# Patient Record
Sex: FEMALE | Race: BLACK OR AFRICAN AMERICAN | Employment: FULL TIME | URBAN - METROPOLITAN AREA
[De-identification: names, ages, dates, MRNs, and addresses within clinical notes are randomized per-mention and may not be internally consistent; named-entity substitution may affect disease eponyms.]

---

## 2017-10-06 RX ORDER — MEDROXYPROGESTERONE ACETATE 150 MG/ML
150 INJECTION, SUSPENSION INTRAMUSCULAR
Qty: 1 SYRINGE | Refills: 0 | Status: SHIPPED | OUTPATIENT
Start: 2017-10-06 | End: 2021-08-17

## 2017-10-06 NOTE — TELEPHONE ENCOUNTER
Refill request for Medroxyprogesterone rec'd from pharmacy. Pts last appt 8/2016. Last refill was 5/29/17. Will discuss with Dr. Kassandra Rivero.  LM for pt

## 2017-10-06 NOTE — TELEPHONE ENCOUNTER
Refill request for Medroxyprogesterone 150mg IM susp. Pt last seen 8/16/16. Per Dr. Feroz Mariscal ok to refill one time. Pt to make a f/u appt for further refills. Sent to pharmacy.

## 2018-06-08 RX ORDER — MEDROXYPROGESTERONE ACETATE 150 MG/ML
150 INJECTION, SUSPENSION INTRAMUSCULAR
Qty: 1 SYRINGE | Refills: 0 | OUTPATIENT
Start: 2018-06-08

## 2020-08-13 ENCOUNTER — APPOINTMENT (OUTPATIENT)
Dept: GENERAL RADIOLOGY | Facility: HOSPITAL | Age: 55
End: 2020-08-13
Attending: EMERGENCY MEDICINE
Payer: COMMERCIAL

## 2020-08-13 ENCOUNTER — HOSPITAL ENCOUNTER (EMERGENCY)
Facility: HOSPITAL | Age: 55
Discharge: HOME | End: 2020-08-13
Attending: EMERGENCY MEDICINE
Payer: COMMERCIAL

## 2020-08-13 VITALS
DIASTOLIC BLOOD PRESSURE: 79 MMHG | HEART RATE: 75 BPM | HEIGHT: 65 IN | BODY MASS INDEX: 33.32 KG/M2 | OXYGEN SATURATION: 99 % | RESPIRATION RATE: 15 BRPM | TEMPERATURE: 98 F | SYSTOLIC BLOOD PRESSURE: 116 MMHG | WEIGHT: 200 LBS

## 2020-08-13 DIAGNOSIS — R11.2 NAUSEA VOMITING AND DIARRHEA: Primary | ICD-10-CM

## 2020-08-13 DIAGNOSIS — R19.7 NAUSEA VOMITING AND DIARRHEA: Primary | ICD-10-CM

## 2020-08-13 LAB
ALBUMIN SERPL-MCNC: 4.1 G/DL (ref 3.4–5)
ALP LIVER SERPL-CCNC: 114 U/L (ref 46–118)
ALT SERPL-CCNC: 20 U/L (ref 13–56)
ANION GAP SERPL CALC-SCNC: 9 MMOL/L (ref 0–18)
AST SERPL-CCNC: 18 U/L (ref 15–37)
BASOPHILS # BLD AUTO: 0.03 X10(3) UL (ref 0–0.2)
BASOPHILS NFR BLD AUTO: 0.4 %
BILIRUB DIRECT SERPL-MCNC: 0.1 MG/DL (ref 0–0.2)
BILIRUB SERPL-MCNC: 0.5 MG/DL (ref 0.1–2)
BUN BLD-MCNC: 15 MG/DL (ref 7–18)
BUN/CREAT SERPL: 11.9 (ref 10–20)
CALCIUM BLD-MCNC: 10 MG/DL (ref 8.5–10.1)
CHLORIDE SERPL-SCNC: 99 MMOL/L (ref 98–112)
CO2 SERPL-SCNC: 27 MMOL/L (ref 21–32)
CREAT BLD-MCNC: 1.26 MG/DL (ref 0.55–1.02)
DEPRECATED RDW RBC AUTO: 37.8 FL (ref 35.1–46.3)
EOSINOPHIL # BLD AUTO: 0.09 X10(3) UL (ref 0–0.7)
EOSINOPHIL NFR BLD AUTO: 1.2 %
ERYTHROCYTE [DISTWIDTH] IN BLOOD BY AUTOMATED COUNT: 12.8 % (ref 11–15)
GLUCOSE BLD-MCNC: 96 MG/DL (ref 70–99)
HCT VFR BLD AUTO: 39.4 % (ref 35–48)
HGB BLD-MCNC: 13.6 G/DL (ref 12–16)
IMM GRANULOCYTES # BLD AUTO: 0.01 X10(3) UL (ref 0–1)
IMM GRANULOCYTES NFR BLD: 0.1 %
LIPASE SERPL-CCNC: 87 U/L (ref 73–393)
LYMPHOCYTES # BLD AUTO: 2.14 X10(3) UL (ref 1–4)
LYMPHOCYTES NFR BLD AUTO: 27.8 %
M PROTEIN MFR SERPL ELPH: 8.9 G/DL (ref 6.4–8.2)
MCH RBC QN AUTO: 28.1 PG (ref 26–34)
MCHC RBC AUTO-ENTMCNC: 34.5 G/DL (ref 31–37)
MCV RBC AUTO: 81.4 FL (ref 80–100)
MONOCYTES # BLD AUTO: 0.69 X10(3) UL (ref 0.1–1)
MONOCYTES NFR BLD AUTO: 9 %
NEUTROPHILS # BLD AUTO: 4.73 X10 (3) UL (ref 1.5–7.7)
NEUTROPHILS # BLD AUTO: 4.73 X10(3) UL (ref 1.5–7.7)
NEUTROPHILS NFR BLD AUTO: 61.5 %
OSMOLALITY SERPL CALC.SUM OF ELEC: 281 MOSM/KG (ref 275–295)
PLATELET # BLD AUTO: 316 10(3)UL (ref 150–450)
POTASSIUM SERPL-SCNC: 3.6 MMOL/L (ref 3.5–5.1)
RBC # BLD AUTO: 4.84 X10(6)UL (ref 3.8–5.3)
SARS-COV-2 RNA RESP QL NAA+PROBE: NOT DETECTED
SODIUM SERPL-SCNC: 135 MMOL/L (ref 136–145)
WBC # BLD AUTO: 7.7 X10(3) UL (ref 4–11)

## 2020-08-13 PROCEDURE — 85025 COMPLETE CBC W/AUTO DIFF WBC: CPT

## 2020-08-13 PROCEDURE — 96372 THER/PROPH/DIAG INJ SC/IM: CPT

## 2020-08-13 PROCEDURE — 83690 ASSAY OF LIPASE: CPT | Performed by: EMERGENCY MEDICINE

## 2020-08-13 PROCEDURE — 85025 COMPLETE CBC W/AUTO DIFF WBC: CPT | Performed by: EMERGENCY MEDICINE

## 2020-08-13 PROCEDURE — 80076 HEPATIC FUNCTION PANEL: CPT | Performed by: EMERGENCY MEDICINE

## 2020-08-13 PROCEDURE — 80048 BASIC METABOLIC PNL TOTAL CA: CPT

## 2020-08-13 PROCEDURE — 96361 HYDRATE IV INFUSION ADD-ON: CPT

## 2020-08-13 PROCEDURE — 71045 X-RAY EXAM CHEST 1 VIEW: CPT | Performed by: EMERGENCY MEDICINE

## 2020-08-13 PROCEDURE — 96374 THER/PROPH/DIAG INJ IV PUSH: CPT

## 2020-08-13 PROCEDURE — 99284 EMERGENCY DEPT VISIT MOD MDM: CPT

## 2020-08-13 PROCEDURE — 80048 BASIC METABOLIC PNL TOTAL CA: CPT | Performed by: EMERGENCY MEDICINE

## 2020-08-13 RX ORDER — DICYCLOMINE HYDROCHLORIDE 10 MG/ML
20 INJECTION INTRAMUSCULAR ONCE
Status: COMPLETED | OUTPATIENT
Start: 2020-08-13 | End: 2020-08-13

## 2020-08-13 RX ORDER — DICYCLOMINE HCL 20 MG
20 TABLET ORAL 4 TIMES DAILY PRN
Qty: 30 TABLET | Refills: 0 | Status: SHIPPED | OUTPATIENT
Start: 2020-08-13 | End: 2020-09-12

## 2020-08-13 RX ORDER — ONDANSETRON 2 MG/ML
4 INJECTION INTRAMUSCULAR; INTRAVENOUS ONCE
Status: COMPLETED | OUTPATIENT
Start: 2020-08-13 | End: 2020-08-13

## 2020-08-13 RX ORDER — ONDANSETRON 4 MG/1
4 TABLET, ORALLY DISINTEGRATING ORAL EVERY 4 HOURS PRN
Qty: 10 TABLET | Refills: 0 | Status: SHIPPED | OUTPATIENT
Start: 2020-08-13 | End: 2020-08-20

## 2020-08-13 NOTE — ED PROVIDER NOTES
Patient Seen in: [facility] AND [facility] Emergency Department      History   Patient presents with:  Nausea/Vomiting/Diarrhea    Stated Complaint: TL - vomiting x 3 days, sweating, nausea.   April K    HPI    62 yoF with HTN and IBS presents for evaluation of n Effort: Pulmonary effort is normal.      Breath sounds: Normal breath sounds. Abdominal:      General: There is no distension. Palpations: Abdomen is soft. Tenderness: There is no tenderness.  There is no right CVA tenderness or left CVA tende symptoms, poor p.o. intake, or abdominal pain and she is agreeable to the plan.               Disposition and Plan     Clinical Impression:  Nausea vomiting and diarrhea  (primary encounter diagnosis)    Disposition:  Discharge  8/13/2020  1:16 pm    Follow

## 2020-09-09 ENCOUNTER — OFFICE VISIT (OUTPATIENT)
Dept: OTOLARYNGOLOGY | Facility: CLINIC | Age: 55
End: 2020-09-09
Payer: COMMERCIAL

## 2020-09-09 VITALS
SYSTOLIC BLOOD PRESSURE: 166 MMHG | WEIGHT: 200 LBS | TEMPERATURE: 99 F | HEIGHT: 65 IN | DIASTOLIC BLOOD PRESSURE: 104 MMHG | BODY MASS INDEX: 33.32 KG/M2

## 2020-09-09 DIAGNOSIS — R13.10 DYSPHAGIA, UNSPECIFIED TYPE: Primary | ICD-10-CM

## 2020-09-09 PROCEDURE — 3077F SYST BP >= 140 MM HG: CPT | Performed by: OTOLARYNGOLOGY

## 2020-09-09 PROCEDURE — 31575 DIAGNOSTIC LARYNGOSCOPY: CPT | Performed by: OTOLARYNGOLOGY

## 2020-09-09 PROCEDURE — 3080F DIAST BP >= 90 MM HG: CPT | Performed by: OTOLARYNGOLOGY

## 2020-09-09 PROCEDURE — 99203 OFFICE O/P NEW LOW 30 MIN: CPT | Performed by: OTOLARYNGOLOGY

## 2020-09-09 PROCEDURE — 3008F BODY MASS INDEX DOCD: CPT | Performed by: OTOLARYNGOLOGY

## 2020-09-09 RX ORDER — LISINOPRIL AND HYDROCHLOROTHIAZIDE 25; 20 MG/1; MG/1
TABLET ORAL
COMMUNITY
Start: 2020-07-20

## 2020-09-09 RX ORDER — OMEPRAZOLE 40 MG/1
CAPSULE, DELAYED RELEASE ORAL
COMMUNITY
Start: 2020-08-19 | End: 2021-08-17

## 2020-09-09 RX ORDER — DICLOFENAC SODIUM 75 MG/1
TABLET, DELAYED RELEASE ORAL
COMMUNITY
Start: 2020-09-06

## 2020-09-09 RX ORDER — MONTELUKAST SODIUM 10 MG/1
10 TABLET ORAL DAILY
COMMUNITY
Start: 2020-08-19 | End: 2021-08-17

## 2020-09-09 NOTE — PROGRESS NOTES
Germaine Love is a 62year old female.   Patient presents with:  Throat Problem: c/o burning sensation, per pt she have acid reflux      HISTORY OF PRESENT ILLNESS  9/9/2020  Patient presents for evaluation of concerns over a persistent feeling in her t Respiratory Negative Dyspnea and wheezing. Cardio Negative Chest pain, irregular heartbeat/palpitations and syncope. GI Negative Abdominal pain and diarrhea. Endocrine Negative Cold intolerance and heat intolerance. Neuro Negative Tremors.    Psyc proceed. Topical pontocaine and neosynephrine was instilled into the bilateral nasal cavities. The flexible fiberoptic laryngoscope was threaded into the right nasal cavity and threaded into the nasopharynx.  The septum was noted to be deviated  The turbina then a reassessment. SHe has been omeprazole for years in the past and this has never helped her so she doesnt want to proceed.  I recommended a videos wallowing study and also that she cond= food elimination diets to see if this helps her gas and blo

## 2020-09-10 ENCOUNTER — ORDER TRANSCRIPTION (OUTPATIENT)
Dept: ADMINISTRATIVE | Facility: HOSPITAL | Age: 55
End: 2020-09-10

## 2020-09-10 DIAGNOSIS — R13.10 DYSPHAGIA, UNSPECIFIED TYPE: Primary | ICD-10-CM

## 2020-09-10 DIAGNOSIS — Z01.818 PRE-PROCEDURAL EXAMINATION: ICD-10-CM

## 2020-09-10 DIAGNOSIS — Z11.59 ENCOUNTER FOR SCREENING FOR OTHER VIRAL DISEASES: ICD-10-CM

## 2020-09-16 ENCOUNTER — LAB ENCOUNTER (OUTPATIENT)
Dept: LAB | Facility: HOSPITAL | Age: 55
End: 2020-09-16
Attending: OTOLARYNGOLOGY
Payer: COMMERCIAL

## 2020-09-16 DIAGNOSIS — Z01.812 PRE-PROCEDURE LAB EXAM: Primary | ICD-10-CM

## 2020-09-17 LAB — SARS-COV-2 RNA RESP QL NAA+PROBE: NOT DETECTED

## 2020-09-18 ENCOUNTER — PATIENT MESSAGE (OUTPATIENT)
Dept: ADMINISTRATIVE | Facility: HOSPITAL | Age: 55
End: 2020-09-18

## 2020-09-19 ENCOUNTER — HOSPITAL ENCOUNTER (OUTPATIENT)
Dept: GENERAL RADIOLOGY | Facility: HOSPITAL | Age: 55
Discharge: HOME | End: 2020-09-19
Attending: OTOLARYNGOLOGY
Payer: COMMERCIAL

## 2020-09-19 DIAGNOSIS — R13.10 DYSPHAGIA, UNSPECIFIED TYPE: ICD-10-CM

## 2020-09-19 PROCEDURE — 92611 MOTION FLUOROSCOPY/SWALLOW: CPT

## 2020-09-19 PROCEDURE — 74230 X-RAY XM SWLNG FUNCJ C+: CPT | Performed by: OTOLARYNGOLOGY

## 2020-11-15 ENCOUNTER — APPOINTMENT (OUTPATIENT)
Dept: CT IMAGING | Facility: HOSPITAL | Age: 55
End: 2020-11-15
Attending: EMERGENCY MEDICINE
Payer: COMMERCIAL

## 2020-11-15 ENCOUNTER — HOSPITAL ENCOUNTER (EMERGENCY)
Facility: HOSPITAL | Age: 55
Discharge: HOME | End: 2020-11-15
Attending: EMERGENCY MEDICINE
Payer: COMMERCIAL

## 2020-11-15 VITALS
WEIGHT: 200 LBS | TEMPERATURE: 98 F | SYSTOLIC BLOOD PRESSURE: 118 MMHG | BODY MASS INDEX: 33.32 KG/M2 | HEIGHT: 65 IN | DIASTOLIC BLOOD PRESSURE: 85 MMHG | RESPIRATION RATE: 20 BRPM | OXYGEN SATURATION: 97 % | HEART RATE: 77 BPM

## 2020-11-15 DIAGNOSIS — K86.2 PANCREATIC CYST: ICD-10-CM

## 2020-11-15 DIAGNOSIS — R10.9 ABDOMINAL PAIN, ACUTE: ICD-10-CM

## 2020-11-15 DIAGNOSIS — N30.00 ACUTE CYSTITIS WITHOUT HEMATURIA: ICD-10-CM

## 2020-11-15 DIAGNOSIS — R11.2 NAUSEA AND VOMITING IN ADULT: Primary | ICD-10-CM

## 2020-11-15 PROCEDURE — 80076 HEPATIC FUNCTION PANEL: CPT | Performed by: EMERGENCY MEDICINE

## 2020-11-15 PROCEDURE — S0028 INJECTION, FAMOTIDINE, 20 MG: HCPCS | Performed by: EMERGENCY MEDICINE

## 2020-11-15 PROCEDURE — 99285 EMERGENCY DEPT VISIT HI MDM: CPT

## 2020-11-15 PROCEDURE — 83690 ASSAY OF LIPASE: CPT | Performed by: EMERGENCY MEDICINE

## 2020-11-15 PROCEDURE — 85025 COMPLETE CBC W/AUTO DIFF WBC: CPT | Performed by: EMERGENCY MEDICINE

## 2020-11-15 PROCEDURE — 81001 URINALYSIS AUTO W/SCOPE: CPT | Performed by: EMERGENCY MEDICINE

## 2020-11-15 PROCEDURE — 96374 THER/PROPH/DIAG INJ IV PUSH: CPT

## 2020-11-15 PROCEDURE — 96361 HYDRATE IV INFUSION ADD-ON: CPT

## 2020-11-15 PROCEDURE — 80048 BASIC METABOLIC PNL TOTAL CA: CPT | Performed by: EMERGENCY MEDICINE

## 2020-11-15 PROCEDURE — 96375 TX/PRO/DX INJ NEW DRUG ADDON: CPT

## 2020-11-15 PROCEDURE — 74177 CT ABD & PELVIS W/CONTRAST: CPT | Performed by: EMERGENCY MEDICINE

## 2020-11-15 PROCEDURE — 87086 URINE CULTURE/COLONY COUNT: CPT | Performed by: EMERGENCY MEDICINE

## 2020-11-15 RX ORDER — ONDANSETRON 4 MG/1
4 TABLET, ORALLY DISINTEGRATING ORAL EVERY 4 HOURS PRN
Qty: 10 TABLET | Refills: 0 | Status: SHIPPED | OUTPATIENT
Start: 2020-11-15 | End: 2020-11-22

## 2020-11-15 RX ORDER — PANTOPRAZOLE SODIUM 40 MG/1
40 TABLET, DELAYED RELEASE ORAL DAILY
Qty: 30 TABLET | Refills: 0 | Status: SHIPPED | OUTPATIENT
Start: 2020-11-15 | End: 2020-12-15

## 2020-11-15 RX ORDER — CEPHALEXIN 500 MG/1
500 CAPSULE ORAL 2 TIMES DAILY
Qty: 14 CAPSULE | Refills: 0 | Status: SHIPPED | OUTPATIENT
Start: 2020-11-15 | End: 2020-11-22

## 2020-11-15 RX ORDER — DICYCLOMINE HCL 20 MG
20 TABLET ORAL 4 TIMES DAILY PRN
Qty: 30 TABLET | Refills: 0 | Status: SHIPPED | OUTPATIENT
Start: 2020-11-15 | End: 2020-12-15

## 2020-11-15 RX ORDER — KETOROLAC TROMETHAMINE 15 MG/ML
15 INJECTION, SOLUTION INTRAMUSCULAR; INTRAVENOUS ONCE
Status: COMPLETED | OUTPATIENT
Start: 2020-11-15 | End: 2020-11-15

## 2020-11-15 RX ORDER — ONDANSETRON 2 MG/ML
4 INJECTION INTRAMUSCULAR; INTRAVENOUS ONCE
Status: COMPLETED | OUTPATIENT
Start: 2020-11-15 | End: 2020-11-15

## 2020-11-15 RX ORDER — FAMOTIDINE 10 MG/ML
20 INJECTION, SOLUTION INTRAVENOUS ONCE
Status: COMPLETED | OUTPATIENT
Start: 2020-11-15 | End: 2020-11-15

## 2020-11-15 NOTE — ED NOTES
Discharge instructions given to patient. Verbalized understanding. Patient in no distress. Patient left ED ambulatory steady gait. No distress. Patient tolerated meal tray.

## 2020-11-15 NOTE — ED INITIAL ASSESSMENT (HPI)
Pt presents to ED for vomiting/gagging x 4 days. Pt states she is unable to keep anything down. Pt denies diarrhea. + stomach pain in the lower part of her abdomen. Pt states she has a hx of GERD.  Pt did a video chat with her pmd and had her sertraline inc

## 2020-11-15 NOTE — ED PROVIDER NOTES
Patient Seen in: [facility] AND [facility] Emergency Department      History   Patient presents with:  Vomiting  Abdomen/Flank Pain    Stated Complaint: vomiting    HPI    59-year-old female presents for evaluation for nausea vomiting and abdominal pain.   Emesis 77   Temp 97.7 °F (36.5 °C) (Temporal)   Resp 20   Ht 165.1 cm (5' 5\")   Wt 90.7 kg   SpO2 97%   BMI 33.28 kg/m²         Physical Exam  Vitals signs and nursing note reviewed. Constitutional:       Appearance: She is well-developed.    HENT:      Head: N limits   LIPASE - Abnormal; Notable for the following components:    Lipase 72 (*)     All other components within normal limits   HEPATIC FUNCTION PANEL (7) - Abnormal; Notable for the following components:    AST 14 (*)     Total Protein 8.3 (*)     All (ER)  COMPARISON: None. INDICATIONS: Generalized abdominal pain with vomiting. TECHNIQUE: CT images of the abdomen and pelvis were obtained with non-ionic intravenous contrast material.  Automated exposure control for dose reduction was used.  Adjustment Sigmoid colon diverticulosis.     Dictated by (CST): Paul Bernard MD on 11/15/2020 at 8:02 AM     Finalized by (CST): Paul Bernard MD on 11/15/2020 at 8:10 AM              Clinical impression as well as any lab results and radiology findings were discus

## 2021-09-20 ENCOUNTER — TELEMEDICINE (OUTPATIENT)
Dept: INTERNAL MEDICINE CLINIC | Facility: CLINIC | Age: 56
End: 2021-09-20

## 2021-09-20 VITALS — BODY MASS INDEX: 36 KG/M2 | WEIGHT: 214 LBS

## 2021-09-20 DIAGNOSIS — E55.9 VITAMIN D DEFICIENCY: ICD-10-CM

## 2021-09-20 DIAGNOSIS — F41.9 ANXIETY AND DEPRESSION: ICD-10-CM

## 2021-09-20 DIAGNOSIS — K58.9 IRRITABLE BOWEL SYNDROME, UNSPECIFIED TYPE: ICD-10-CM

## 2021-09-20 DIAGNOSIS — F32.A ANXIETY AND DEPRESSION: ICD-10-CM

## 2021-09-20 DIAGNOSIS — E66.9 CLASS 2 OBESITY WITH BODY MASS INDEX (BMI) OF 35.0 TO 35.9 IN ADULT, UNSPECIFIED OBESITY TYPE, UNSPECIFIED WHETHER SERIOUS COMORBIDITY PRESENT: ICD-10-CM

## 2021-09-20 DIAGNOSIS — I10 HYPERTENSION, UNSPECIFIED TYPE: ICD-10-CM

## 2021-09-20 DIAGNOSIS — Z51.81 ENCOUNTER FOR THERAPEUTIC DRUG LEVEL MONITORING: Primary | ICD-10-CM

## 2021-09-20 PROCEDURE — 99204 OFFICE O/P NEW MOD 45 MIN: CPT | Performed by: PHYSICIAN ASSISTANT

## 2021-09-20 RX ORDER — CLONIDINE HYDROCHLORIDE 0.2 MG/1
1 TABLET ORAL 2 TIMES DAILY
COMMUNITY
Start: 2021-08-24

## 2021-09-20 NOTE — PROGRESS NOTES
[facility] WEIGHT MANAGEMENT VIRTUAL VIDEO ENCOUNTER     Kan Sinclair verbally consents to a Virtual/Telephone Check-In service on 09/20/21  Patient understands and accepts financial responsibility for any deductible, co-insurance and/or co-pays night    MEDICAL HISTORY  PMH reviewed:   Cardiac disorders: Mild mitral valve insufficiency  Depression/anxiety: YES - currently on zoloft  Glaucoma: negative  Kidney stones: negative  Eating disorder: negative  Migraines/seizures: negative  Joint-related 11/15/2020    GFRAA 91 11/15/2020    CA 9.7 11/15/2020    OSMOCALC 285 11/15/2020    ALKPHO 115 11/15/2020    AST 14 (L) 11/15/2020    ALT 16 11/15/2020    BILT 0.6 11/15/2020    TP 8.3 (H) 11/15/2020    ALB 4.0 11/15/2020     11/15/2020    K 3.6 11/ consuming enough calories  · Will hold on medication until after surgery  · EKG will be done at in person OV  · Will start medications: n/a  · --advised of side effects and adverse effects of this medication  · Contradictions: stimulant until EKG  · Body c Healthy snacks: peanut butter and apples, hummus and carrots, berries, nuts (1/4 cup), tuna and crackers                 Protein Shakes: Premier protein or Core Power                Protein Bars: Rx Bars, Oatmega, Power Crunch                 [place] betsey Raspberries: Half a cup (60 grams) contains 3 grams of carbs. Blackberries: Half a cup (70 grams) contains 4 grams of carbs. Strawberries: Half a cup (100 grams) contains 6 grams of carbs. Blueberries: Half a cup (50 grams) contains 6 grams of carbs.

## 2021-09-22 NOTE — PATIENT INSTRUCTIONS
1300 calories    Moderate Carb (30/35/35)  103g  Protein    53g  Fats    120g  carbs    Lower Carb (40/40/20)  137g  Protein    61g  Fats    68g  carbs    We are here to support you with weight loss, but please remember that you still need your primary car per week                Activity level: not very active (can't count exercise towards calorie number per day)                   ** Daily INPUT> Look at nutrition section-- \"nutrients\" and it will break down your macros for the day (ie.  Protein, carbs, fi

## 2021-09-27 ENCOUNTER — LAB ENCOUNTER (OUTPATIENT)
Dept: LAB | Facility: HOSPITAL | Age: 56
End: 2021-09-27
Attending: PHYSICIAN ASSISTANT
Payer: COMMERCIAL

## 2021-09-27 DIAGNOSIS — E66.9 CLASS 2 OBESITY WITH BODY MASS INDEX (BMI) OF 35.0 TO 35.9 IN ADULT, UNSPECIFIED OBESITY TYPE, UNSPECIFIED WHETHER SERIOUS COMORBIDITY PRESENT: ICD-10-CM

## 2021-09-27 DIAGNOSIS — E55.9 VITAMIN D DEFICIENCY: ICD-10-CM

## 2021-09-27 PROCEDURE — 82607 VITAMIN B-12: CPT

## 2021-09-27 PROCEDURE — 84443 ASSAY THYROID STIM HORMONE: CPT

## 2021-09-27 PROCEDURE — 80061 LIPID PANEL: CPT

## 2021-09-27 PROCEDURE — 85025 COMPLETE CBC W/AUTO DIFF WBC: CPT

## 2021-09-27 PROCEDURE — 82306 VITAMIN D 25 HYDROXY: CPT

## 2021-09-27 PROCEDURE — 80053 COMPREHEN METABOLIC PANEL: CPT

## 2021-09-27 PROCEDURE — 84439 ASSAY OF FREE THYROXINE: CPT

## 2021-09-27 PROCEDURE — 36415 COLL VENOUS BLD VENIPUNCTURE: CPT

## 2021-09-27 PROCEDURE — 83036 HEMOGLOBIN GLYCOSYLATED A1C: CPT

## 2021-10-04 RX ORDER — ERGOCALCIFEROL 1.25 MG/1
50000 CAPSULE ORAL WEEKLY
Qty: 12 CAPSULE | Refills: 0 | Status: SHIPPED | OUTPATIENT
Start: 2021-10-04 | End: 2021-10-29

## 2021-10-04 NOTE — PROGRESS NOTES
A1C consistent with prediabetes  Low Vit D, begin 50,000IU weekly x 12 weeks, and then OTC 2,000IU daily with food  + elevated cholesterol  Will discuss more at next OV

## 2021-10-29 ENCOUNTER — OFFICE VISIT (OUTPATIENT)
Dept: INTERNAL MEDICINE CLINIC | Facility: CLINIC | Age: 56
End: 2021-10-29
Payer: COMMERCIAL

## 2021-10-29 VITALS
WEIGHT: 223 LBS | DIASTOLIC BLOOD PRESSURE: 100 MMHG | SYSTOLIC BLOOD PRESSURE: 160 MMHG | OXYGEN SATURATION: 97 % | HEART RATE: 84 BPM | BODY MASS INDEX: 39.02 KG/M2 | RESPIRATION RATE: 16 BRPM | HEIGHT: 63.5 IN

## 2021-10-29 DIAGNOSIS — E55.9 VITAMIN D DEFICIENCY: ICD-10-CM

## 2021-10-29 DIAGNOSIS — E66.01 CLASS 2 SEVERE OBESITY WITH SERIOUS COMORBIDITY AND BODY MASS INDEX (BMI) OF 38.0 TO 38.9 IN ADULT, UNSPECIFIED OBESITY TYPE (HCC): ICD-10-CM

## 2021-10-29 DIAGNOSIS — Z51.81 ENCOUNTER FOR THERAPEUTIC DRUG LEVEL MONITORING: Primary | ICD-10-CM

## 2021-10-29 DIAGNOSIS — F32.A ANXIETY AND DEPRESSION: ICD-10-CM

## 2021-10-29 DIAGNOSIS — F41.9 ANXIETY AND DEPRESSION: ICD-10-CM

## 2021-10-29 DIAGNOSIS — I10 HYPERTENSION, UNSPECIFIED TYPE: ICD-10-CM

## 2021-10-29 DIAGNOSIS — R73.03 PREDIABETES: ICD-10-CM

## 2021-10-29 PROCEDURE — 3080F DIAST BP >= 90 MM HG: CPT | Performed by: PHYSICIAN ASSISTANT

## 2021-10-29 PROCEDURE — 99214 OFFICE O/P EST MOD 30 MIN: CPT | Performed by: PHYSICIAN ASSISTANT

## 2021-10-29 PROCEDURE — 3077F SYST BP >= 140 MM HG: CPT | Performed by: PHYSICIAN ASSISTANT

## 2021-10-29 PROCEDURE — 3008F BODY MASS INDEX DOCD: CPT | Performed by: PHYSICIAN ASSISTANT

## 2021-10-29 RX ORDER — TOPIRAMATE 25 MG/1
25 TABLET ORAL 2 TIMES DAILY
Qty: 180 TABLET | Refills: 0 | Status: SHIPPED | OUTPATIENT
Start: 2021-10-29 | End: 2022-01-06

## 2021-10-29 RX ORDER — FAMOTIDINE 40 MG/1
40 TABLET, FILM COATED ORAL DAILY
COMMUNITY
End: 2022-01-06

## 2021-10-29 RX ORDER — MULTIVITAMIN
1 TABLET ORAL DAILY
COMMUNITY
End: 2022-01-06

## 2021-10-29 NOTE — PROGRESS NOTES
HISTORY OF PRESENT ILLNESS  Patient presents with:  Weight Check: up [address] is a 60year old female here for follow up in medical weight loss program.   Up 9lbs  Not currently on any AOM  Denies chest pain, shortness of breath, dizziness, Results   Component Value Date    GLU 94 09/27/2021    BUN 18 09/27/2021    BUNCREA 18.0 09/27/2021    CREATSERUM 1.00 09/27/2021    ANIONGAP 7 09/27/2021    GFRNAA 63 09/27/2021    GFRAA 72 09/27/2021    CA 9.2 09/27/2021    OSMOCALC 292 09/27/2021    ALK depression    Vitamin D deficiency    Other orders  -     topiramate 25 MG Oral Tab; Take 1 tablet (25 mg total) by mouth 2 (two) times daily.         PLAN  Initial Weight: 214lbs - home weight  Initial Weight Date: 9/20/21  Today's Weight: 223lbs  5% Goal:

## 2021-11-09 ENCOUNTER — LAB REQUISITION (OUTPATIENT)
Dept: SURGERY | Age: 56
End: 2021-11-09
Payer: COMMERCIAL

## 2021-11-09 DIAGNOSIS — Z01.818 PREOP EXAMINATION: ICD-10-CM

## 2021-11-12 ENCOUNTER — LAB REQUISITION (OUTPATIENT)
Dept: SURGERY | Age: 56
End: 2021-11-12
Payer: COMMERCIAL

## 2021-11-12 ENCOUNTER — LAB REQUISITION (OUTPATIENT)
Dept: LAB | Facility: HOSPITAL | Age: 56
End: 2021-11-12
Payer: COMMERCIAL

## 2021-11-12 DIAGNOSIS — K80.45 CALCULUS OF BILE DUCT WITH CHRONIC CHOLECYSTITIS WITH OBSTRUCTION: ICD-10-CM

## 2021-11-12 PROCEDURE — 88304 TISSUE EXAM BY PATHOLOGIST: CPT | Performed by: SURGERY

## 2021-11-12 RX ORDER — TRAMADOL HYDROCHLORIDE 50 MG/1
50 TABLET ORAL EVERY 6 HOURS PRN
Qty: 10 TABLET | Refills: 0 | Status: SHIPPED | OUTPATIENT
Start: 2021-11-12 | End: 2022-01-06

## 2021-12-20 RX ORDER — ERGOCALCIFEROL 1.25 MG/1
50000 CAPSULE ORAL WEEKLY
Qty: 12 CAPSULE | Refills: 0 | OUTPATIENT
Start: 2021-12-20

## 2021-12-20 NOTE — TELEPHONE ENCOUNTER
Requesting   Requested Prescriptions     Pending Prescriptions Disp Refills   • ERGOCALCIFEROL 1.25 MG (86301 UT) Oral Cap [Pharmacy Med Name: VITAMIN D2 1.25MG(50,000 UNIT)] 12 capsule 0     Sig: TAKE 1 CAPSULE (50,000 UNITS TOTAL) BY MOUTH ONCE A WEEK.  Shanda Shaffer

## 2022-01-19 RX ORDER — TOPIRAMATE 25 MG/1
TABLET ORAL
Qty: 180 TABLET | Refills: 0 | Status: SHIPPED | OUTPATIENT
Start: 2022-01-19

## 2022-01-19 NOTE — TELEPHONE ENCOUNTER
Requesting   Requested Prescriptions     Pending Prescriptions Disp Refills   • TOPIRAMATE 25 MG Oral Tab [Pharmacy Med Name: TOPIRAMATE 25 MG TABLET] 180 tablet 0     Sig: TAKE 1 TABLET BY MOUTH TWICE A DAY     LOV: 10/29/21  RTC: none  Filled: 10/29/21 #

## 2022-06-27 ENCOUNTER — PATIENT MESSAGE (OUTPATIENT)
Dept: RHEUMATOLOGY | Facility: CLINIC | Age: 57
End: 2022-06-27

## 2022-06-27 NOTE — TELEPHONE ENCOUNTER
From: Naima Gorman  To: Kendra Ryan MD  Sent: 6/27/2022 8:55 AM CDT  Subject: Cancel July 26 Appointment     Please cancel 12 pm appointment on July 26. It was made in error.      Marry Ramon

## 2022-07-01 ENCOUNTER — OFFICE VISIT (OUTPATIENT)
Dept: OTOLARYNGOLOGY | Facility: CLINIC | Age: 57
End: 2022-07-01
Payer: COMMERCIAL

## 2022-07-01 VITALS — TEMPERATURE: 97 F

## 2022-07-01 DIAGNOSIS — R04.0 NOSEBLEED: ICD-10-CM

## 2022-07-01 DIAGNOSIS — R07.0 THROAT PAIN: Primary | ICD-10-CM

## 2022-07-01 RX ORDER — PANTOPRAZOLE SODIUM 40 MG/1
40 TABLET, DELAYED RELEASE ORAL DAILY
Qty: 60 TABLET | Refills: 3 | Status: SHIPPED | OUTPATIENT
Start: 2022-07-01

## 2022-07-01 RX ORDER — VENLAFAXINE HYDROCHLORIDE 150 MG/1
150 TABLET, EXTENDED RELEASE ORAL
COMMUNITY
Start: 2022-03-15

## 2022-07-01 RX ORDER — CELECOXIB 100 MG/1
100 CAPSULE ORAL DAILY
COMMUNITY
Start: 2022-05-02

## 2022-07-01 RX ORDER — TRAZODONE HYDROCHLORIDE 50 MG/1
25 TABLET ORAL NIGHTLY
COMMUNITY
Start: 2022-05-01

## 2022-07-01 RX ORDER — FLUTICASONE PROPIONATE 50 MCG
SPRAY, SUSPENSION (ML) NASAL DAILY
COMMUNITY

## 2022-07-01 RX ORDER — FAMOTIDINE 10 MG
10 TABLET ORAL 2 TIMES DAILY
COMMUNITY

## 2022-07-01 RX ORDER — AMLODIPINE BESYLATE 5 MG/1
5 TABLET ORAL DAILY
COMMUNITY
Start: 2022-05-11

## 2022-07-01 RX ORDER — MONTELUKAST SODIUM 10 MG/1
10 TABLET ORAL NIGHTLY
Qty: 30 TABLET | Refills: 3 | Status: SHIPPED | OUTPATIENT
Start: 2022-07-01

## 2022-07-13 ENCOUNTER — HOSPITAL ENCOUNTER (EMERGENCY)
Facility: HOSPITAL | Age: 57
Discharge: HOME | End: 2022-07-13
Attending: EMERGENCY MEDICINE
Payer: COMMERCIAL

## 2022-07-13 VITALS
RESPIRATION RATE: 16 BRPM | HEIGHT: 64 IN | BODY MASS INDEX: 33.29 KG/M2 | HEART RATE: 85 BPM | OXYGEN SATURATION: 95 % | DIASTOLIC BLOOD PRESSURE: 88 MMHG | TEMPERATURE: 99 F | WEIGHT: 195 LBS | SYSTOLIC BLOOD PRESSURE: 153 MMHG

## 2022-07-13 DIAGNOSIS — K58.9 IRRITABLE BOWEL SYNDROME, UNSPECIFIED TYPE: ICD-10-CM

## 2022-07-13 DIAGNOSIS — K92.2 GASTROINTESTINAL HEMORRHAGE, UNSPECIFIED GASTROINTESTINAL HEMORRHAGE TYPE: Primary | ICD-10-CM

## 2022-07-13 LAB
ANION GAP SERPL CALC-SCNC: 9 MMOL/L (ref 0–18)
BASOPHILS # BLD AUTO: 0.01 X10(3) UL (ref 0–0.2)
BASOPHILS NFR BLD AUTO: 0.1 %
BUN BLD-MCNC: 11 MG/DL (ref 7–18)
BUN/CREAT SERPL: 15.3 (ref 10–20)
CALCIUM BLD-MCNC: 9.5 MG/DL (ref 8.5–10.1)
CHLORIDE SERPL-SCNC: 105 MMOL/L (ref 98–112)
CO2 SERPL-SCNC: 28 MMOL/L (ref 21–32)
CREAT BLD-MCNC: 0.72 MG/DL
DEPRECATED RDW RBC AUTO: 39.3 FL (ref 35.1–46.3)
EOSINOPHIL # BLD AUTO: 0.11 X10(3) UL (ref 0–0.7)
EOSINOPHIL NFR BLD AUTO: 1 %
ERYTHROCYTE [DISTWIDTH] IN BLOOD BY AUTOMATED COUNT: 12.7 % (ref 11–15)
GLUCOSE BLD-MCNC: 101 MG/DL (ref 70–99)
HCT VFR BLD AUTO: 36 %
HGB BLD-MCNC: 11.8 G/DL
IMM GRANULOCYTES # BLD AUTO: 0.02 X10(3) UL (ref 0–1)
IMM GRANULOCYTES NFR BLD: 0.2 %
LYMPHOCYTES # BLD AUTO: 3.74 X10(3) UL (ref 1–4)
LYMPHOCYTES NFR BLD AUTO: 35.5 %
MCH RBC QN AUTO: 28.1 PG (ref 26–34)
MCHC RBC AUTO-ENTMCNC: 32.8 G/DL (ref 31–37)
MCV RBC AUTO: 85.7 FL
MONOCYTES # BLD AUTO: 0.81 X10(3) UL (ref 0.1–1)
MONOCYTES NFR BLD AUTO: 7.7 %
NEUTROPHILS # BLD AUTO: 5.86 X10 (3) UL (ref 1.5–7.7)
NEUTROPHILS # BLD AUTO: 5.86 X10(3) UL (ref 1.5–7.7)
NEUTROPHILS NFR BLD AUTO: 55.5 %
OSMOLALITY SERPL CALC.SUM OF ELEC: 294 MOSM/KG (ref 275–295)
PLATELET # BLD AUTO: 268 10(3)UL (ref 150–450)
POTASSIUM SERPL-SCNC: 3 MMOL/L (ref 3.5–5.1)
RBC # BLD AUTO: 4.2 X10(6)UL
SODIUM SERPL-SCNC: 142 MMOL/L (ref 136–145)
WBC # BLD AUTO: 10.6 X10(3) UL (ref 4–11)

## 2022-07-13 PROCEDURE — 99284 EMERGENCY DEPT VISIT MOD MDM: CPT

## 2022-07-13 PROCEDURE — 85025 COMPLETE CBC W/AUTO DIFF WBC: CPT | Performed by: EMERGENCY MEDICINE

## 2022-07-13 PROCEDURE — 82272 OCCULT BLD FECES 1-3 TESTS: CPT

## 2022-07-13 PROCEDURE — 96372 THER/PROPH/DIAG INJ SC/IM: CPT

## 2022-07-13 PROCEDURE — S0028 INJECTION, FAMOTIDINE, 20 MG: HCPCS | Performed by: EMERGENCY MEDICINE

## 2022-07-13 PROCEDURE — 80048 BASIC METABOLIC PNL TOTAL CA: CPT | Performed by: EMERGENCY MEDICINE

## 2022-07-13 PROCEDURE — 96361 HYDRATE IV INFUSION ADD-ON: CPT

## 2022-07-13 PROCEDURE — 96374 THER/PROPH/DIAG INJ IV PUSH: CPT

## 2022-07-13 RX ORDER — TRAMADOL HYDROCHLORIDE 50 MG/1
TABLET ORAL EVERY 6 HOURS PRN
Qty: 10 TABLET | Refills: 0 | Status: SHIPPED | OUTPATIENT
Start: 2022-07-13 | End: 2022-07-18

## 2022-07-13 RX ORDER — DICYCLOMINE HYDROCHLORIDE 10 MG/ML
20 INJECTION INTRAMUSCULAR ONCE
Status: COMPLETED | OUTPATIENT
Start: 2022-07-13 | End: 2022-07-13

## 2022-07-13 RX ORDER — PANTOPRAZOLE SODIUM 40 MG/1
40 TABLET, DELAYED RELEASE ORAL DAILY
Qty: 30 TABLET | Refills: 0 | Status: SHIPPED | OUTPATIENT
Start: 2022-07-13 | End: 2022-08-12

## 2022-07-13 RX ORDER — FAMOTIDINE 10 MG/ML
20 INJECTION, SOLUTION INTRAVENOUS ONCE
Status: COMPLETED | OUTPATIENT
Start: 2022-07-13 | End: 2022-07-13

## 2022-07-13 NOTE — ED INITIAL ASSESSMENT (HPI)
Patient to Er from home with c/o multiple episode of bright red bleeding from the rectum starting last night.

## 2022-09-29 NOTE — TELEPHONE ENCOUNTER
This refill request is being sent to the provider for the following reason:  []Patient has not had an appointment within the past 12 months but has made an appointment on: ___  []Medication is not within protocol  [x]Patient did not complete follow up recommendations    LOV: 07/01/2022  1 month follow up. I did recommend trialing Claritin-D Singulair and starting her on mupirocin 3 times daily for her nasal crusting to see if we can avoid production of these nasal clots on the right. Return to see me in 1 month.     []Other: ___

## 2022-10-01 RX ORDER — MONTELUKAST SODIUM 10 MG/1
TABLET ORAL
Qty: 90 TABLET | Refills: 1 | OUTPATIENT
Start: 2022-10-01

## 2022-10-13 ENCOUNTER — OFFICE VISIT (OUTPATIENT)
Dept: AUDIOLOGY | Facility: CLINIC | Age: 57
End: 2022-10-13
Payer: COMMERCIAL

## 2022-10-13 DIAGNOSIS — H90.3 SENSORINEURAL HEARING LOSS, BILATERAL: Primary | ICD-10-CM

## 2022-10-13 PROCEDURE — 92567 TYMPANOMETRY: CPT | Performed by: AUDIOLOGIST

## 2022-10-13 PROCEDURE — 92557 COMPREHENSIVE HEARING TEST: CPT | Performed by: AUDIOLOGIST

## 2022-11-07 RX ORDER — MONTELUKAST SODIUM 10 MG/1
10 TABLET ORAL NIGHTLY
Qty: 30 TABLET | Refills: 3 | Status: SHIPPED | OUTPATIENT
Start: 2022-11-07

## 2022-11-22 ENCOUNTER — OFFICE VISIT (OUTPATIENT)
Dept: AUDIOLOGY | Facility: CLINIC | Age: 57
End: 2022-11-22
Payer: COMMERCIAL

## 2022-11-22 DIAGNOSIS — H90.3 SENSORINEURAL HEARING LOSS, BILATERAL: Primary | ICD-10-CM

## 2022-11-22 PROCEDURE — 92591 HEARING AID EXAM, BOTH EARS: CPT | Performed by: AUDIOLOGIST

## 2022-11-22 PROCEDURE — 92700 UNLISTED ORL SERVICE/PX: CPT | Performed by: AUDIOLOGIST

## 2022-11-23 NOTE — PROGRESS NOTES
Hearing Aid Evaluation    Ky Coles  12/28/1965  [id number]    Ky Coles is a first time hearing aid user. She does not have any hearing aid benefits through her insurance. She is primarily bothered by her tinnitus and was hoping that hearing aids could be helpful in managing tinnitus. 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 Discussed styles and options, as well as level of technology. Patient would be agreeable to GUILLERMO style with rylan (small ear canals) and is interested in bluetooth capabilities. She is not ready to proceed at this time and does have financial concerns in affording hearing aids even at option level 4. Explanation of Audiogram  Patient's hearing loss  Communication difficulties  Importance of binaural instruments  Performance of noise  Benefits/limitations of style  Adjustment period and follow up visits    Hearing Aid Features  Automatic selection  Dual microphones  Noise suppression  Compatible with Bluetooth    Charges Associated with Hearing Aids  Hearing aid evaluation  Hearing aid(s)  Custom earmolds  Payment in full at delivery  Batteries  Office visits after service agreement ends    Patient is not interested in proceeding with hearing aid order at this time. She will continue to monitor her finances and follow up at some point in future. Limited consultation.   $50 charge entered.          11/23/2022  Luciana Thurman

## 2022-12-15 ENCOUNTER — OFFICE VISIT (OUTPATIENT)
Dept: OTOLARYNGOLOGY | Facility: CLINIC | Age: 57
End: 2022-12-15
Payer: COMMERCIAL

## 2022-12-15 VITALS — HEIGHT: 64 IN | BODY MASS INDEX: 32.95 KG/M2 | WEIGHT: 193 LBS

## 2022-12-15 DIAGNOSIS — R09.82 POSTNASAL DISCHARGE: Primary | ICD-10-CM

## 2022-12-15 DIAGNOSIS — R05.9 COUGH, UNSPECIFIED TYPE: ICD-10-CM

## 2022-12-15 RX ORDER — GLYCOPYRROLATE 1 MG/1
1 TABLET ORAL 3 TIMES DAILY
Qty: 90 TABLET | Refills: 1 | Status: SHIPPED | OUTPATIENT
Start: 2022-12-15

## 2022-12-24 ENCOUNTER — HOSPITAL ENCOUNTER (EMERGENCY)
Facility: HOSPITAL | Age: 57
Discharge: HOME | End: 2022-12-24
Attending: EMERGENCY MEDICINE
Payer: COMMERCIAL

## 2022-12-24 ENCOUNTER — APPOINTMENT (OUTPATIENT)
Dept: CT IMAGING | Facility: HOSPITAL | Age: 57
End: 2022-12-24
Attending: EMERGENCY MEDICINE
Payer: COMMERCIAL

## 2022-12-24 VITALS
HEART RATE: 84 BPM | RESPIRATION RATE: 18 BRPM | WEIGHT: 195 LBS | SYSTOLIC BLOOD PRESSURE: 162 MMHG | BODY MASS INDEX: 33 KG/M2 | OXYGEN SATURATION: 98 % | DIASTOLIC BLOOD PRESSURE: 104 MMHG | TEMPERATURE: 98 F

## 2022-12-24 DIAGNOSIS — S06.0X0A CONCUSSION WITHOUT LOSS OF CONSCIOUSNESS, INITIAL ENCOUNTER: Primary | ICD-10-CM

## 2022-12-24 PROCEDURE — 70450 CT HEAD/BRAIN W/O DYE: CPT | Performed by: EMERGENCY MEDICINE

## 2022-12-24 PROCEDURE — 99284 EMERGENCY DEPT VISIT MOD MDM: CPT

## 2022-12-24 NOTE — ED INITIAL ASSESSMENT (HPI)
Patient complains of falling and hitting her posterior head one week ago, states she feels \"foggy\"

## 2023-01-04 ENCOUNTER — PATIENT MESSAGE (OUTPATIENT)
Dept: AUDIOLOGY | Facility: CLINIC | Age: 58
End: 2023-01-04

## 2023-01-06 NOTE — TELEPHONE ENCOUNTER
Ordering Gianluca Silveira L30-R  Edinson Molina  #1 wire (may need to adjust to #2)  Small closed domes (may adjust in office)      [facility] Appt scheduled for Jan 20th

## 2023-01-09 NOTE — TELEPHONE ENCOUNTER
This refill request is being sent to the provider for the following reason:  []Patient has not had an appointment within the past 12 months but has made an appointment on: ___  [x]Medication is not within protocol  []Patient did not complete follow up recommendations  []Other: ___  Dr. Chevy Ching, please review.

## 2023-01-20 ENCOUNTER — OFFICE VISIT (OUTPATIENT)
Dept: AUDIOLOGY | Facility: CLINIC | Age: 58
End: 2023-01-20

## 2023-01-20 DIAGNOSIS — H90.3 SENSORINEURAL HEARING LOSS, BILATERAL: Primary | ICD-10-CM

## 2023-01-20 PROCEDURE — V5261 HEARING AID, DIGIT, BIN, BTE: HCPCS | Performed by: AUDIOLOGIST

## 2023-01-20 RX ORDER — GLYCOPYRROLATE 1 MG/1
TABLET ORAL
Qty: 90 TABLET | Refills: 1 | Status: SHIPPED | OUTPATIENT
Start: 2023-01-20

## 2023-01-20 NOTE — PROGRESS NOTES
Hearing Aid [address] purchased two hearing aids. Hearing Aid Information  Rashida Cuellar L30-R  Right #[id number]  Left #[id number]  Coupled to smallest cap dome  S receivers  Date dispensed:  1-20-23  Battery: Rechargeable  Wax Guard: [address]      Did not pair to phone at this visit. Completed REM and adjusted for good match of NAL-N2 targets. Patient does have very small ear canals, so ended up switching to S  and #0 wire on left,   (Will order new #0 wire for right, wearing #1 at this time). Patient noticed new sounds in office. Reviewed use and care. Only briefly mentioned wax guards as getting cap dome off may prove difficult. The following items were demonstrated to the patient:  Insertion/removal of hearing aid into ear  Insertion/removal of battery  Adjustment of volume  Changing programs  Function and operation of controls  Telephone use  Cleaning of hearing aid/earmold  Storage of hearing aids  Use of accessories  Remote control operation    The patient was informed of or received the following:  Adjustment period and realistic expectations  Battery size  Battery ingestion warning  Cleaning tools  Hearing aid instruction book   Carrying case  Purchase agreement  Repair/loss coverage  Trial period/return policy  Service period  Medical clearance    Patient expressed understanding to all discussed topics. Follow-up scheduled for 2 weeks.       01/20/23  Luciana Steinberg

## 2023-02-03 ENCOUNTER — APPOINTMENT (OUTPATIENT)
Dept: PHYSICAL THERAPY | Facility: HOSPITAL | Age: 58
End: 2023-02-03
Payer: COMMERCIAL

## 2023-02-03 ENCOUNTER — OFFICE VISIT (OUTPATIENT)
Dept: AUDIOLOGY | Facility: CLINIC | Age: 58
End: 2023-02-03

## 2023-02-03 DIAGNOSIS — H90.3 SENSORINEURAL HEARING LOSS, BILATERAL: Primary | ICD-10-CM

## 2023-02-03 PROCEDURE — 92593 HEARING AID CHECK, BOTH EARS: CPT | Performed by: AUDIOLOGIST

## 2023-02-03 NOTE — PROGRESS NOTES
HEARING AID FOLLOW-UP    Ruth Leger  12/28/1965  [id number]    Patient is here for first follow up visit after getting new hearing aids. Hearing Aid Information  Genoveva Muir L30-R  Right #[id number]  Left #[id number]  Coupled to smallest cap dome  S receivers  Date dispensed:  1-20-23  Battery: Rechargeable  Wax Guard: [facility]      In office the following actions were taken:  Patient had questions regarding FB when in case. (Aids were feeding back at time of visit as she had unplugged  and brought them in that). Reviewed how to turn aids off/on and to watch for this. Reviewed use of wax guard and taking off dome. Patient able to sucessfully change in office today. Told she really should only do this if she suspects a problem. Ear canals completely clear today but some wax is on reciever. Did not change wire length today. #1 on right and #0 on left both seem to fit well. Datalogging shows 12 hours avg daily use and shows consistent increase in volume adjustment. Increased overall by 3dB in office today and will monitor this. Discussed benefit from aids. She does see less awareness of tinnitus when she is wearing aids. She is less clear on how beneficial aids are at work/home. Encouraged her to continue wearing consistently and monitor this. Patient is to follow up with appt on March 3 in one month.        2/3/2023  Luciana Lopez

## 2023-02-15 RX ORDER — GLYCOPYRROLATE 1 MG/1
TABLET ORAL
Qty: 90 TABLET | Refills: 1 | Status: SHIPPED | OUTPATIENT
Start: 2023-02-15

## 2023-02-15 NOTE — TELEPHONE ENCOUNTER
This refill request is being sent to the provider for the following reason:  []Patient has not had an appointment within the past 12 months but has made an appointment on: ___  [x]Medication is not within protocol  []Patient did not complete follow up recommendations  []Other: ___  Dr. Winslow Home, please review order and send. Thank you.  LOV 12/15/2022

## 2023-02-16 ENCOUNTER — TELEPHONE (OUTPATIENT)
Dept: PHYSICAL THERAPY | Facility: HOSPITAL | Age: 58
End: 2023-02-16

## 2023-02-17 ENCOUNTER — OFFICE VISIT (OUTPATIENT)
Dept: PHYSICAL THERAPY | Facility: HOSPITAL | Age: 58
End: 2023-02-17
Payer: COMMERCIAL

## 2023-02-17 ENCOUNTER — TELEPHONE (OUTPATIENT)
Dept: PHYSICAL THERAPY | Facility: HOSPITAL | Age: 58
End: 2023-02-17

## 2023-02-17 DIAGNOSIS — M54.16 LUMBAR RADICULOPATHY: ICD-10-CM

## 2023-02-17 PROCEDURE — 97110 THERAPEUTIC EXERCISES: CPT

## 2023-02-17 PROCEDURE — 97162 PT EVAL MOD COMPLEX 30 MIN: CPT

## 2023-02-21 RX ORDER — MONTELUKAST SODIUM 10 MG/1
TABLET ORAL
Qty: 90 TABLET | Refills: 1 | Status: SHIPPED | OUTPATIENT
Start: 2023-02-21

## 2023-02-22 ENCOUNTER — OFFICE VISIT (OUTPATIENT)
Dept: PHYSICAL THERAPY | Facility: HOSPITAL | Age: 58
End: 2023-02-22
Payer: COMMERCIAL

## 2023-02-22 PROCEDURE — 97110 THERAPEUTIC EXERCISES: CPT

## 2023-02-22 PROCEDURE — 97140 MANUAL THERAPY 1/> REGIONS: CPT

## 2023-02-24 ENCOUNTER — APPOINTMENT (OUTPATIENT)
Dept: PHYSICAL THERAPY | Facility: HOSPITAL | Age: 58
End: 2023-02-24
Payer: COMMERCIAL

## 2023-03-03 ENCOUNTER — TELEPHONE (OUTPATIENT)
Dept: PHYSICAL THERAPY | Facility: HOSPITAL | Age: 58
End: 2023-03-03

## 2023-03-03 ENCOUNTER — APPOINTMENT (OUTPATIENT)
Dept: PHYSICAL THERAPY | Facility: HOSPITAL | Age: 58
End: 2023-03-03
Payer: COMMERCIAL

## 2023-03-03 ENCOUNTER — OFFICE VISIT (OUTPATIENT)
Dept: AUDIOLOGY | Facility: CLINIC | Age: 58
End: 2023-03-03

## 2023-03-03 DIAGNOSIS — H90.3 SENSORINEURAL HEARING LOSS, BILATERAL: Primary | ICD-10-CM

## 2023-03-03 PROCEDURE — 92593 HEARING AID CHECK, BOTH EARS: CPT | Performed by: AUDIOLOGIST

## 2023-03-10 ENCOUNTER — APPOINTMENT (OUTPATIENT)
Dept: PHYSICAL THERAPY | Facility: HOSPITAL | Age: 58
End: 2023-03-10
Payer: COMMERCIAL

## 2023-03-17 ENCOUNTER — OFFICE VISIT (OUTPATIENT)
Dept: PHYSICAL THERAPY | Facility: HOSPITAL | Age: 58
End: 2023-03-17
Payer: COMMERCIAL

## 2023-03-17 PROCEDURE — 97112 NEUROMUSCULAR REEDUCATION: CPT

## 2023-03-17 PROCEDURE — 97110 THERAPEUTIC EXERCISES: CPT

## 2023-03-17 RX ORDER — GLYCOPYRROLATE 1 MG/1
TABLET ORAL
Qty: 90 TABLET | Refills: 1 | Status: SHIPPED | OUTPATIENT
Start: 2023-03-17

## 2023-03-24 ENCOUNTER — OFFICE VISIT (OUTPATIENT)
Dept: PHYSICAL THERAPY | Facility: HOSPITAL | Age: 58
End: 2023-03-24
Payer: COMMERCIAL

## 2023-03-24 PROCEDURE — 97112 NEUROMUSCULAR REEDUCATION: CPT

## 2023-03-24 PROCEDURE — 97530 THERAPEUTIC ACTIVITIES: CPT

## 2023-03-31 ENCOUNTER — OFFICE VISIT (OUTPATIENT)
Dept: PHYSICAL THERAPY | Facility: HOSPITAL | Age: 58
End: 2023-03-31
Payer: COMMERCIAL

## 2023-03-31 PROCEDURE — 97112 NEUROMUSCULAR REEDUCATION: CPT

## 2023-03-31 PROCEDURE — 97110 THERAPEUTIC EXERCISES: CPT

## 2023-04-14 ENCOUNTER — OFFICE VISIT (OUTPATIENT)
Dept: AUDIOLOGY | Facility: CLINIC | Age: 58
End: 2023-04-14

## 2023-04-14 DIAGNOSIS — H90.3 SENSORINEURAL HEARING LOSS, BILATERAL: Primary | ICD-10-CM

## 2023-04-14 PROCEDURE — 92593 HEARING AID CHECK, BOTH EARS: CPT | Performed by: AUDIOLOGIST

## 2023-04-14 NOTE — PROGRESS NOTES
HEARING AID FOLLOW-UP    Naima Gorman  12/28/1965  [id number]    Patient is here for new hearing aid fitting/exchange after returning her previous hearing aid that was originally fit on 1-20-23. HEARING AID INFORMATION:   Oticon Zircon 2 minirite   Option 4  Right #[id number]  Left #[id number]  Coupled to #1/60 receivers  5mm openbass domes  Dispensing date:  4-14-23  Battery: Rechargeable  Wax Guard:  Prowax      In office the following actions were taken:  Oriented patient to hearing aid and demonstrated insertion/removal.   Completed REM and adjusted for NAL-N2 targets. Increased overall gain 2dB as patient was not sure she could tell when aid was on/working. Patient is to follow up in May 19th for hearing aid check. Encouraged her to call [employer] message if any questions or problems.      4/14/2023  Luciana Helton

## 2023-04-17 ENCOUNTER — OFFICE VISIT (OUTPATIENT)
Dept: PHYSICAL THERAPY | Facility: HOSPITAL | Age: 58
End: 2023-04-17
Payer: COMMERCIAL

## 2023-04-17 PROCEDURE — 97110 THERAPEUTIC EXERCISES: CPT

## 2023-04-17 PROCEDURE — 97112 NEUROMUSCULAR REEDUCATION: CPT

## 2023-04-19 RX ORDER — GLYCOPYRROLATE 1 MG/1
TABLET ORAL
Qty: 90 TABLET | Refills: 1 | Status: SHIPPED | OUTPATIENT
Start: 2023-04-19

## 2023-04-19 NOTE — TELEPHONE ENCOUNTER
This refill request is being sent to the provider for the following reason:  []Patient has not had an appointment within the past 12 months but has made an appointment on: ___  [x]Medication is not within protocol  []Patient did not complete follow up recommendations  []Other: ___  Dr. Elly Buchanan, please review and send. Thank you.

## 2023-04-28 ENCOUNTER — OFFICE VISIT (OUTPATIENT)
Dept: PHYSICAL THERAPY | Facility: HOSPITAL | Age: 58
End: 2023-04-28
Payer: COMMERCIAL

## 2023-04-28 PROCEDURE — 97110 THERAPEUTIC EXERCISES: CPT

## 2023-04-28 PROCEDURE — 97112 NEUROMUSCULAR REEDUCATION: CPT

## 2023-05-05 ENCOUNTER — HOSPITAL ENCOUNTER (OUTPATIENT)
Dept: GENERAL RADIOLOGY | Facility: HOSPITAL | Age: 58
Discharge: HOME | End: 2023-05-05
Attending: INTERNAL MEDICINE
Payer: COMMERCIAL

## 2023-05-05 ENCOUNTER — OFFICE VISIT (OUTPATIENT)
Dept: RHEUMATOLOGY | Facility: CLINIC | Age: 58
End: 2023-05-05

## 2023-05-05 ENCOUNTER — LAB ENCOUNTER (OUTPATIENT)
Dept: LAB | Facility: HOSPITAL | Age: 58
End: 2023-05-05
Attending: INTERNAL MEDICINE
Payer: COMMERCIAL

## 2023-05-05 VITALS
DIASTOLIC BLOOD PRESSURE: 73 MMHG | BODY MASS INDEX: 35.17 KG/M2 | SYSTOLIC BLOOD PRESSURE: 113 MMHG | HEART RATE: 79 BPM | WEIGHT: 206 LBS | HEIGHT: 64 IN

## 2023-05-05 DIAGNOSIS — M79.642 BILATERAL HAND PAIN: ICD-10-CM

## 2023-05-05 DIAGNOSIS — M79.641 BILATERAL HAND PAIN: ICD-10-CM

## 2023-05-05 DIAGNOSIS — M25.50 POLYARTHRALGIA: ICD-10-CM

## 2023-05-05 DIAGNOSIS — M65.341 TRIGGER FINGER, RIGHT RING FINGER: Primary | ICD-10-CM

## 2023-05-05 LAB
CRP SERPL-MCNC: 0.55 MG/DL (ref ?–0.3)
ERYTHROCYTE [SEDIMENTATION RATE] IN BLOOD: 43 MM/HR
RHEUMATOID FACT SERPL-ACNC: <10 IU/ML (ref ?–15)

## 2023-05-05 PROCEDURE — 73130 X-RAY EXAM OF HAND: CPT | Performed by: INTERNAL MEDICINE

## 2023-05-05 PROCEDURE — 86140 C-REACTIVE PROTEIN: CPT | Performed by: INTERNAL MEDICINE

## 2023-05-05 PROCEDURE — 86200 CCP ANTIBODY: CPT | Performed by: INTERNAL MEDICINE

## 2023-05-05 PROCEDURE — 3078F DIAST BP <80 MM HG: CPT | Performed by: INTERNAL MEDICINE

## 2023-05-05 PROCEDURE — 3008F BODY MASS INDEX DOCD: CPT | Performed by: INTERNAL MEDICINE

## 2023-05-05 PROCEDURE — 20550 NJX 1 TENDON SHEATH/LIGAMENT: CPT | Performed by: INTERNAL MEDICINE

## 2023-05-05 PROCEDURE — 36415 COLL VENOUS BLD VENIPUNCTURE: CPT | Performed by: INTERNAL MEDICINE

## 2023-05-05 PROCEDURE — 3074F SYST BP LT 130 MM HG: CPT | Performed by: INTERNAL MEDICINE

## 2023-05-05 PROCEDURE — 99244 OFF/OP CNSLTJ NEW/EST MOD 40: CPT | Performed by: INTERNAL MEDICINE

## 2023-05-05 PROCEDURE — 85652 RBC SED RATE AUTOMATED: CPT | Performed by: INTERNAL MEDICINE

## 2023-05-05 PROCEDURE — 86431 RHEUMATOID FACTOR QUANT: CPT | Performed by: INTERNAL MEDICINE

## 2023-05-05 RX ORDER — METHYLPREDNISOLONE ACETATE 80 MG/ML
20 INJECTION, SUSPENSION INTRA-ARTICULAR; INTRALESIONAL; INTRAMUSCULAR; SOFT TISSUE ONCE
Status: COMPLETED | OUTPATIENT
Start: 2023-05-05 | End: 2023-05-05

## 2023-05-05 RX ADMIN — METHYLPREDNISOLONE ACETATE 20 MG: 80 INJECTION, SUSPENSION INTRA-ARTICULAR; INTRALESIONAL; INTRAMUSCULAR; SOFT TISSUE at 16:04:00

## 2023-05-05 NOTE — PATIENT INSTRUCTIONS
You are seen today for joint pain, hand pain and trigger finger  We injected your right fourth finger with cortisone, hopefully it helps  Plan to get x-rays and blood work

## 2023-05-08 LAB — CCP IGG SERPL-ACNC: 1 U/ML (ref 0–6.9)

## 2023-05-11 ENCOUNTER — PATIENT MESSAGE (OUTPATIENT)
Dept: RHEUMATOLOGY | Facility: CLINIC | Age: 58
End: 2023-05-11

## 2023-05-11 RX ORDER — GABAPENTIN 100 MG/1
100 CAPSULE ORAL NIGHTLY
Qty: 30 CAPSULE | Refills: 1 | Status: SHIPPED | OUTPATIENT
Start: 2023-05-11

## 2023-05-11 NOTE — TELEPHONE ENCOUNTER
From: Tadeo Lima  To: Daphne Willard MD  Sent: 5/11/2023 4:05 AM CDT  Subject: Pain    Good morning,     I am still experiencing the burning sensation in my fingers. I also still have pain in my wrists. The Tylenol does not relieve the pain. These symptoms were the main reasons I wanted to see a specialist.     Yes, my trigger finger has improved. Can you please help with the other symptoms?     Kalvin Nissen

## 2023-05-15 ENCOUNTER — TELEPHONE (OUTPATIENT)
Dept: RHEUMATOLOGY | Facility: CLINIC | Age: 58
End: 2023-05-15

## 2023-05-15 RX ORDER — MELOXICAM 7.5 MG/1
7.5 TABLET ORAL 2 TIMES DAILY
Qty: 60 TABLET | Refills: 0 | Status: SHIPPED | OUTPATIENT
Start: 2023-05-15

## 2023-05-19 ENCOUNTER — OFFICE VISIT (OUTPATIENT)
Dept: AUDIOLOGY | Facility: CLINIC | Age: 58
End: 2023-05-19

## 2023-05-19 DIAGNOSIS — H90.3 SENSORINEURAL HEARING LOSS, BILATERAL: Primary | ICD-10-CM

## 2023-05-19 PROCEDURE — 92593 HEARING AID CHECK, BOTH EARS: CPT | Performed by: AUDIOLOGIST

## 2023-05-19 NOTE — PROGRESS NOTES
HEARING AID FOLLOW-UP    Ky Coles  12/28/1965  [id number]    Patient is here for first follow up after hearing aid fitting. HEARING AID INFORMATION:   Oticon Zircon 2 minirite   Option 4  Right #[id number]  Left #[id number]  Coupled to #1/60 receivers  5mm openbass domes  Dispensing date:  4-14-23  Battery: Rechargeable  Wax Guard:  Prowax    Patient opted to return hearing aids for credit. She felt aids added too much background noise. Aids were accepted back in good condition and will be [facility] to [facility]. Refund is due to patient in amount of $1950. Patient is to follow up for annual audiograms as needed.        5/19/2023  Jenifer ThurmanD

## 2023-05-22 RX ORDER — GLYCOPYRROLATE 1 MG/1
TABLET ORAL
Qty: 90 TABLET | Refills: 1 | Status: SHIPPED | OUTPATIENT
Start: 2023-05-22

## 2023-05-26 ENCOUNTER — APPOINTMENT (OUTPATIENT)
Dept: PHYSICAL THERAPY | Facility: HOSPITAL | Age: 58
End: 2023-05-26
Attending: Other
Payer: COMMERCIAL

## 2023-06-09 ENCOUNTER — APPOINTMENT (OUTPATIENT)
Dept: PHYSICAL THERAPY | Facility: HOSPITAL | Age: 58
End: 2023-06-09
Attending: Other
Payer: COMMERCIAL

## 2023-06-09 RX ORDER — MELOXICAM 7.5 MG/1
7.5 TABLET ORAL 2 TIMES DAILY
Qty: 60 TABLET | Refills: 0 | Status: SHIPPED | OUTPATIENT
Start: 2023-06-09

## 2023-07-20 NOTE — TELEPHONE ENCOUNTER
Meloxicam refill   Last 6/9/23 refilled     LOV: 5/5/23  No future appointments. LABS:  Component      Latest Ref Rng 5/5/2023   C-REACTIVE PROTEIN      <0.30 mg/dL 0.55 (H)    C-Citrullinated Peptide IgG AB      0.0 - 6.9 U/mL 1.0    SED RATE      0 - 30 mm/Hr 43 (H)    RHEUMATOID FACTOR      <15 IU/mL <10       ASSESSMENT AND PLAN:      Right hand pain, right fourth trigger finger  - She has a lot of pain in her thumb and her right fourth trigger finger  - Right fourth A1 pulley region injected with 0.25 cc of lidocaine and 20 mg of Depo-Medrol in sterile fashion. Patient tolerated procedure well  - blood work and XRays today     Generalized osteoarthritis  - She also has a lot of joint pain everywhere. Currently taking Tylenol arthritis 650 mg 6 times a day. Thank you for allowing me to participate in this patients care.  Pt will f/u as needed     Ranulfo Nelson MD  5/5/2023

## 2023-07-21 RX ORDER — MELOXICAM 7.5 MG/1
7.5 TABLET ORAL 2 TIMES DAILY
Qty: 60 TABLET | Refills: 0 | Status: SHIPPED | OUTPATIENT
Start: 2023-07-21

## 2023-08-21 NOTE — TELEPHONE ENCOUNTER
Requested Prescriptions     Pending Prescriptions Disp Refills    Meloxicam 7.5 MG Oral Tab 60 tablet 0     Sig: Take 1 tablet (7.5 mg total) by mouth in the morning and 1 tablet (7.5 mg total) before bedtime. Lf: 7/21/23 #60 tab w/ 0 rf  LOV: 5/5/23   No future appointments.   Labs:   Component      Latest Ref Rng 5/5/2023   C-REACTIVE PROTEIN      <0.30 mg/dL 0.55 (H)    C-Citrullinated Peptide IgG AB      0.0 - 6.9 U/mL 1.0    SED RATE      0 - 30 mm/Hr 43 (H)    RHEUMATOID FACTOR      <15 IU/mL <10       Legend:  (H) High  You are seen today for joint pain, hand pain and trigger finger  We injected your right fourth finger with cortisone, hopefully it helps  Plan to get x-rays and blood work

## 2023-08-22 RX ORDER — MELOXICAM 7.5 MG/1
7.5 TABLET ORAL 2 TIMES DAILY
Qty: 60 TABLET | Refills: 0 | Status: SHIPPED | OUTPATIENT
Start: 2023-08-22

## 2023-08-23 ENCOUNTER — TELEPHONE (OUTPATIENT)
Dept: RHEUMATOLOGY | Facility: CLINIC | Age: 58
End: 2023-08-23

## 2023-08-23 NOTE — TELEPHONE ENCOUNTER
Fax request from pharmacy asking for alternative for Meloxicam because patient insuranace only covers MAX 1 Tab a day. Please advise.

## 2023-09-11 RX ORDER — MELOXICAM 7.5 MG/1
7.5 TABLET ORAL 2 TIMES DAILY
Qty: 60 TABLET | Refills: 0 | OUTPATIENT
Start: 2023-09-11

## 2023-09-13 ENCOUNTER — PATIENT MESSAGE (OUTPATIENT)
Dept: RHEUMATOLOGY | Facility: CLINIC | Age: 58
End: 2023-09-13

## 2023-09-14 RX ORDER — MELOXICAM 7.5 MG/1
7.5 TABLET ORAL 2 TIMES DAILY
Qty: 60 TABLET | Refills: 0 | Status: SHIPPED | OUTPATIENT
Start: 2023-09-14

## 2023-10-03 NOTE — TELEPHONE ENCOUNTER
LOV:  5/5/23- complete CMP? No future appointments. Labs:    Component      Latest Ref Rng 7/13/2022   Glucose      70 - 99 mg/dL 101 (H)    Sodium      136 - 145 mmol/L 142    Potassium      3.5 - 5.1 mmol/L 3.0 (L)    Chloride      98 - 112 mmol/L 105    Carbon Dioxide, Total      21.0 - 32.0 mmol/L 28.0    ANION GAP      0 - 18 mmol/L 9    BUN      7 - 18 mg/dL 11    CREATININE      0.55 - 1.02 mg/dL 0.72    BUN/CREATININE RATIO      10.0 - 20.0  15.3    CALCIUM      8.5 - 10.1 mg/dL 9.5    CALCULATED OSMOLALITY      275 - 295 mOsm/kg 294    eGFR NON-AFR.  AMERICAN      >=60  93    eGFR       >=60  107       Legend:  (H) High  (L) Low

## 2023-10-12 RX ORDER — MELOXICAM 7.5 MG/1
7.5 TABLET ORAL 2 TIMES DAILY
Qty: 60 TABLET | Refills: 0 | OUTPATIENT
Start: 2023-10-12

## 2023-10-12 RX ORDER — MELOXICAM 7.5 MG/1
7.5 TABLET ORAL DAILY
Qty: 60 TABLET | Refills: 0 | Status: SHIPPED | OUTPATIENT
Start: 2023-10-12

## 2023-10-12 NOTE — TELEPHONE ENCOUNTER
LOV: 5/5/23  Last Refilled:#60, 0rfs 9/14/23    No future appointments.  ASSESSMENT AND PLAN:      Right hand pain, right fourth trigger finger  - She has a lot of pain in her thumb and her right fourth trigger finger  - Right fourth A1 pulley region injected with 0.25 cc of lidocaine and 20 mg of Depo-Medrol in sterile fashion.  Patient tolerated procedure well  - blood work and XRays today     Generalized osteoarthritis  - She also has a lot of joint pain everywhere.  Currently taking Tylenol arthritis 650 mg 6 times a day.     Thank you for allowing me to participate in this patients care. Pt will f/u as needed     Rosemary Sharma MD  5/5/2023  3:37 PM     Please advise.

## 2023-10-29 ENCOUNTER — OFFICE VISIT (OUTPATIENT)
Dept: FAMILY MEDICINE CLINIC | Facility: CLINIC | Age: 58
End: 2023-10-29

## 2023-10-29 VITALS
SYSTOLIC BLOOD PRESSURE: 142 MMHG | DIASTOLIC BLOOD PRESSURE: 94 MMHG | WEIGHT: 184.63 LBS | OXYGEN SATURATION: 97 % | HEART RATE: 91 BPM | BODY MASS INDEX: 31.52 KG/M2 | RESPIRATION RATE: 18 BRPM | HEIGHT: 64 IN | TEMPERATURE: 98 F

## 2023-10-29 DIAGNOSIS — R05.1 ACUTE COUGH: ICD-10-CM

## 2023-10-29 DIAGNOSIS — K21.9 GASTROESOPHAGEAL REFLUX DISEASE, UNSPECIFIED WHETHER ESOPHAGITIS PRESENT: Primary | ICD-10-CM

## 2023-10-29 PROCEDURE — 3077F SYST BP >= 140 MM HG: CPT | Performed by: NURSE PRACTITIONER

## 2023-10-29 PROCEDURE — 3080F DIAST BP >= 90 MM HG: CPT | Performed by: NURSE PRACTITIONER

## 2023-10-29 PROCEDURE — 3008F BODY MASS INDEX DOCD: CPT | Performed by: NURSE PRACTITIONER

## 2023-10-29 PROCEDURE — 99213 OFFICE O/P EST LOW 20 MIN: CPT | Performed by: NURSE PRACTITIONER

## 2023-10-29 RX ORDER — DEXLANSOPRAZOLE 30 MG/1
30 CAPSULE, DELAYED RELEASE ORAL DAILY
Qty: 30 CAPSULE | Refills: 0 | Status: SHIPPED | OUTPATIENT
Start: 2023-10-29 | End: 2023-11-28

## 2023-10-29 RX ORDER — DOCUSATE SODIUM 100 MG/1
100 CAPSULE, LIQUID FILLED ORAL
COMMUNITY
Start: 2023-10-06

## 2023-10-29 RX ORDER — SEMAGLUTIDE 0.68 MG/ML
INJECTION, SOLUTION SUBCUTANEOUS
COMMUNITY
Start: 2023-10-11

## 2023-11-07 ENCOUNTER — PATIENT MESSAGE (OUTPATIENT)
Dept: RHEUMATOLOGY | Facility: CLINIC | Age: 58
End: 2023-11-07

## 2023-11-07 NOTE — TELEPHONE ENCOUNTER
From: Hung Aguirre  To: Xuan Weller  Sent: 11/7/2023 3:18 PM CST  Subject: To Olena Malin - Reschedsunitha    Good afternoon,     Is it possible to have my prescription for meloxican refilled? My request was denied because I was informed I needed to schedule a visit. I did schedule a visit and it has been canceled. Therefore, I am sure there will not be any availability until next year. In the interim, I am suffering physically due to the lack of not being able to consume my prescribed medication. Please advise.      Sincerely,     Zoila Deleon   02-

## 2023-11-08 RX ORDER — MELOXICAM 7.5 MG/1
7.5 TABLET ORAL 2 TIMES DAILY
Qty: 180 TABLET | Refills: 0 | Status: SHIPPED | OUTPATIENT
Start: 2023-11-08

## 2023-11-28 ENCOUNTER — TELEPHONE (OUTPATIENT)
Dept: RHEUMATOLOGY | Facility: CLINIC | Age: 58
End: 2023-11-28

## 2023-11-28 RX ORDER — MELOXICAM 15 MG/1
7.5 TABLET ORAL 2 TIMES DAILY
Qty: 30 TABLET | Refills: 0 | Status: SHIPPED | OUTPATIENT
Start: 2023-11-28

## 2023-11-28 NOTE — TELEPHONE ENCOUNTER
Insurance will not pay for 7.5mg tabs twice daily. Requesting 15mg taking 0.5 tab twice daily. Please advise. Pended script.

## 2023-11-28 NOTE — TELEPHONE ENCOUNTER
Current Outpatient Medications   Medication Sig Dispense Refill    Meloxicam 7.5 MG Oral Tab Take 1 tablet (7.5 mg total) by mouth in the morning and 1 tablet (7.5 mg total) before bedtime.  180 tablet 0         Insurance only covers 1 tab daily

## 2024-03-26 RX ORDER — GLYCOPYRROLATE 1 MG/1
TABLET ORAL
Qty: 90 TABLET | Refills: 1 | OUTPATIENT
Start: 2024-03-26

## 2024-03-26 NOTE — TELEPHONE ENCOUNTER
Rx request for Glycopyrrolate denied due to LOV was 12/15/2022. Attempted to call patient, LMTCB, as well as my chart message sent.

## 2024-03-27 ENCOUNTER — PATIENT MESSAGE (OUTPATIENT)
Dept: OTOLARYNGOLOGY | Facility: CLINIC | Age: 59
End: 2024-03-27

## 2024-03-29 NOTE — TELEPHONE ENCOUNTER
From: Rebecca Perez  To: Abundio Joy  Sent: 3/27/2024 6:53 PM CDT  Subject: Information     Good evening,     Please be advised, I did not request a refill of medication, glycopyrrolate. The pharmacy places medications on auto refill without my knowledge at times. I am aware of the law regarding visits and prescriptions. I do not want to consume that medication any longer.     Sincerely,     Rebecca Perez

## 2024-06-02 ENCOUNTER — OFFICE VISIT (OUTPATIENT)
Dept: FAMILY MEDICINE CLINIC | Facility: CLINIC | Age: 59
End: 2024-06-02
Payer: COMMERCIAL

## 2024-06-02 VITALS
RESPIRATION RATE: 16 BRPM | DIASTOLIC BLOOD PRESSURE: 76 MMHG | OXYGEN SATURATION: 98 % | HEIGHT: 64 IN | BODY MASS INDEX: 32 KG/M2 | SYSTOLIC BLOOD PRESSURE: 126 MMHG | HEART RATE: 76 BPM | TEMPERATURE: 98 F

## 2024-06-02 DIAGNOSIS — N89.8 VAGINAL DISCHARGE: ICD-10-CM

## 2024-06-02 DIAGNOSIS — R35.0 URINARY FREQUENCY: ICD-10-CM

## 2024-06-02 DIAGNOSIS — R39.15 URINARY URGENCY: Primary | ICD-10-CM

## 2024-06-02 PROBLEM — M89.9 DISORDER OF BONE AND ARTICULAR CARTILAGE: Status: ACTIVE | Noted: 2019-09-03

## 2024-06-02 PROBLEM — R10.84 GENERALIZED ABDOMINAL PAIN: Status: ACTIVE | Noted: 2019-12-24

## 2024-06-02 PROBLEM — M94.9 DISORDER OF BONE AND ARTICULAR CARTILAGE: Status: ACTIVE | Noted: 2019-09-03

## 2024-06-02 PROBLEM — L63.9 ALOPECIA AREATA: Status: ACTIVE | Noted: 2018-11-21

## 2024-06-02 PROBLEM — R14.2 BELCHING: Status: ACTIVE | Noted: 2020-02-07

## 2024-06-02 PROBLEM — K21.9 GASTROESOPHAGEAL REFLUX DISEASE WITHOUT ESOPHAGITIS: Status: ACTIVE | Noted: 2019-12-24

## 2024-06-02 PROBLEM — T78.40XA ALLERGY: Status: ACTIVE | Noted: 2024-06-02

## 2024-06-02 PROBLEM — R14.0 BLOATING: Status: ACTIVE | Noted: 2019-12-24

## 2024-06-02 PROBLEM — N80.9 ENDOMETRIOSIS: Status: ACTIVE | Noted: 2024-06-02

## 2024-06-02 PROBLEM — K58.9 IRRITABLE BOWEL SYNDROME (IBS): Status: ACTIVE | Noted: 2024-06-02

## 2024-06-02 PROBLEM — K29.30 CHRONIC SUPERFICIAL GASTRITIS WITHOUT BLEEDING: Status: ACTIVE | Noted: 2022-02-04

## 2024-06-02 PROBLEM — R42 DIZZINESS AND GIDDINESS: Status: ACTIVE | Noted: 2019-09-03

## 2024-06-02 PROBLEM — M19.049 HAND ARTHRITIS: Status: ACTIVE | Noted: 2022-02-04

## 2024-06-02 PROBLEM — I10 ESSENTIAL HYPERTENSION, BENIGN: Status: ACTIVE | Noted: 2024-06-02

## 2024-06-02 PROBLEM — R05.3 CHRONIC COUGH: Status: ACTIVE | Noted: 2024-06-02

## 2024-06-02 PROBLEM — F32.A DEPRESSIVE DISORDER: Status: ACTIVE | Noted: 2024-06-02

## 2024-06-02 PROBLEM — K21.00 GERD WITH ESOPHAGITIS: Status: ACTIVE | Noted: 2020-02-07

## 2024-06-02 PROBLEM — J30.9 ALLERGIC RHINITIS: Status: ACTIVE | Noted: 2024-06-02

## 2024-06-02 PROBLEM — K59.04 CHRONIC IDIOPATHIC CONSTIPATION: Status: ACTIVE | Noted: 2019-12-24

## 2024-06-02 PROBLEM — M22.40 CHONDROMALACIA OF PATELLA: Status: ACTIVE | Noted: 2019-09-03

## 2024-06-02 LAB
APPEARANCE: CLEAR
BILIRUBIN: NEGATIVE
GLUCOSE (URINE DIPSTICK): NEGATIVE MG/DL
LEUKOCYTES: NEGATIVE
MULTISTIX LOT#: ABNORMAL NUMERIC
NITRITE, URINE: NEGATIVE
OCCULT BLOOD: NEGATIVE
PH, URINE: 6 (ref 4.5–8)
PROTEIN (URINE DIPSTICK): NEGATIVE MG/DL
SPECIFIC GRAVITY: 1.02 (ref 1–1.03)
UROBILINOGEN,SEMI-QN: 0.2 MG/DL (ref 0–1.9)

## 2024-06-02 PROCEDURE — 81003 URINALYSIS AUTO W/O SCOPE: CPT | Performed by: NURSE PRACTITIONER

## 2024-06-02 PROCEDURE — 3074F SYST BP LT 130 MM HG: CPT | Performed by: NURSE PRACTITIONER

## 2024-06-02 PROCEDURE — 81514 NFCT DS BV&VAGINITIS DNA ALG: CPT | Performed by: NURSE PRACTITIONER

## 2024-06-02 PROCEDURE — 87086 URINE CULTURE/COLONY COUNT: CPT | Performed by: NURSE PRACTITIONER

## 2024-06-02 PROCEDURE — 3078F DIAST BP <80 MM HG: CPT | Performed by: NURSE PRACTITIONER

## 2024-06-02 PROCEDURE — 99213 OFFICE O/P EST LOW 20 MIN: CPT | Performed by: NURSE PRACTITIONER

## 2024-06-02 RX ORDER — DEXLANSOPRAZOLE 30 MG/1
CAPSULE, DELAYED RELEASE ORAL DAILY
COMMUNITY

## 2024-06-02 RX ORDER — SEMAGLUTIDE 2.68 MG/ML
INJECTION, SOLUTION SUBCUTANEOUS
COMMUNITY
Start: 2024-04-29

## 2024-06-02 RX ORDER — TRAZODONE HYDROCHLORIDE 100 MG/1
TABLET ORAL
COMMUNITY
Start: 2024-03-17

## 2024-06-02 RX ORDER — AMLODIPINE BESYLATE 5 MG/1
TABLET ORAL
COMMUNITY
Start: 2024-05-26

## 2024-06-02 NOTE — PROGRESS NOTES
Subjective:   Patient ID: Rebecca Perez is a 58 year old female.    Patient is a 58 year old female who presents today with complaints of vaginal discharge and vaginal odor x 1 year. Discharge is thin in consistency and yellow in color. The past few days she's noticed some vaginal itching, urinary frequency and urgency. Symptoms are intermittent. She feels a lot of excess moisture in the vaginal area. Also has diarrhea intermittently but has a history of IBS so this is her baseline. Denies low back pain, flank pain, hematuria, dysuria, fevers, body aches, chills, nausea, vomiting, abdominal pain, vaginal bleeding or rashes. No recent antibiotic use. LMP 4 years ago. Is not currently sexually active. Tolerating PO well at home. Attempted treatment prior to arrival = none.        History/Other:   Review of Systems   Constitutional:  Negative for appetite change, chills and fever.   Gastrointestinal:  Positive for diarrhea. Negative for abdominal pain, nausea and vomiting.   Genitourinary:  Positive for frequency, urgency and vaginal discharge. Negative for dysuria, flank pain, genital sores, hematuria and vaginal bleeding.        + vaginal odor, vaginal itching   Musculoskeletal:  Negative for back pain and myalgias.   Skin:  Negative for rash.     Current Outpatient Medications   Medication Sig Dispense Refill    amLODIPine 5 MG Oral Tab       dexlansoprazole 30 MG Oral Capsule Delayed Release Take by mouth daily.      traZODone 100 MG Oral Tab TAKE ONE TABLET BY MOUTH NIGHTLY FOR INSOMNIA      OZEMPIC, 2 MG/DOSE, 8 MG/3ML Subcutaneous Solution Pen-injector INJECT 2 MG EVERY WEEK BY SUBCUTANEOUS ROUTE AS DIRECTED.      Meloxicam 15 MG Oral Tab Take 0.5 tablets (7.5 mg total) by mouth in the morning and 0.5 tablets (7.5 mg total) before bedtime. 30 tablet 0    Meloxicam 7.5 MG Oral Tab Take 1 tablet (7.5 mg total) by mouth in the morning and 1 tablet (7.5 mg total) before bedtime. 180 tablet 0    docusate sodium  100 MG Oral Cap Take 1 capsule (100 mg total) by mouth 2 (two) times daily before meals.      GLYCOPYRROLATE 1 MG Oral Tab TAKE 1 TABLET BY MOUTH THREE TIMES A DAY 90 tablet 1    MONTELUKAST 10 MG Oral Tab TAKE 1 TABLET BY MOUTH EVERY DAY AT NIGHT 90 tablet 1    Venlafaxine HCl  MG Oral Tablet 24 Hr Take 1 tablet (150 mg total) by mouth daily with food.      Lisinopril-hydroCHLOROthiazide 20-25 MG Oral Tab TAKE 1 TABLET BY MOUTH EVERY DAY       Allergies:  Allergies   Allergen Reactions    Shellfish-Derived Products SWELLING    Lidocaine OTHER (SEE COMMENTS)     High blood pressure     /76   Pulse 76   Temp 98.2 °F (36.8 °C)   Resp 16   Ht 5' 4\" (1.626 m)   SpO2 98%   BMI 31.69 kg/m²     Objective:   Physical Exam  Vitals reviewed.   Constitutional:       General: She is awake. She is not in acute distress.     Appearance: Normal appearance. She is well-developed and well-groomed. She is not ill-appearing, toxic-appearing or diaphoretic.   HENT:      Head: Normocephalic and atraumatic.   Cardiovascular:      Rate and Rhythm: Normal rate and regular rhythm.   Pulmonary:      Effort: Pulmonary effort is normal. No respiratory distress.      Breath sounds: Normal breath sounds and air entry. No decreased breath sounds, wheezing, rhonchi or rales.   Abdominal:      General: Abdomen is flat. Bowel sounds are normal. There is no distension.      Palpations: Abdomen is soft.      Tenderness: There is no abdominal tenderness. There is no right CVA tenderness, left CVA tenderness or guarding.   Genitourinary:     General: Normal vulva.      Exam position: Lithotomy position.      González stage (genital): 5.      Labia:         Right: No rash, tenderness or lesion.         Left: No rash, tenderness or lesion.       Vagina: No foreign body. Vaginal discharge present. No erythema, tenderness, bleeding or lesions.      Comments: Thin yellow discharge to vaginal canal  Skin:     General: Skin is warm and dry.    Neurological:      Mental Status: She is alert and oriented to person, place, and time.   Psychiatric:         Behavior: Behavior is cooperative.         Assessment & Plan:   1. Urinary urgency    2. Urinary frequency    3. Vaginal discharge        Orders Placed This Encounter   Procedures    Urine Dip, auto without Micro    Urine Culture, Routine [E]    Vaginitis Vaginosis PCR Panel       Meds This Visit:  Requested Prescriptions      No prescriptions requested or ordered in this encounter     Reviewed POC test results with patient.  Urine cx collected and sent. Will notify of results.  Vaginal swab collected and sent. Will notify of results and recommendations when received.  Reassuring physical exam findings. Vitals WNL.   Supportive care and return to care measures reviewed.  If testing above all negative, to f/u PCP or [facility].  Patient v/u and is comfortable with this plan.    Patient Instructions   We will notify you of vaginal swab results and urine culture results when received   If testing negative and symptoms persist, please follow up with PCP or [facility]

## 2024-06-02 NOTE — PATIENT INSTRUCTIONS
We will notify you of vaginal swab results and urine culture results when received   If testing negative and symptoms persist, please follow up with PCP or [facility]

## 2024-06-04 LAB
BV BACTERIA DNA VAG QL NAA+PROBE: NEGATIVE
C GLABRATA DNA VAG QL NAA+PROBE: NEGATIVE
C KRUSEI DNA VAG QL NAA+PROBE: NEGATIVE
CANDIDA DNA VAG QL NAA+PROBE: NEGATIVE
T VAGINALIS DNA VAG QL NAA+PROBE: NEGATIVE

## 2024-10-13 ENCOUNTER — OFFICE VISIT (OUTPATIENT)
Dept: FAMILY MEDICINE CLINIC | Facility: CLINIC | Age: 59
End: 2024-10-13
Payer: COMMERCIAL

## 2024-10-13 VITALS
TEMPERATURE: 97 F | HEIGHT: 64 IN | SYSTOLIC BLOOD PRESSURE: 120 MMHG | OXYGEN SATURATION: 99 % | DIASTOLIC BLOOD PRESSURE: 84 MMHG | RESPIRATION RATE: 16 BRPM | HEART RATE: 83 BPM | WEIGHT: 143.63 LBS | BODY MASS INDEX: 24.52 KG/M2

## 2024-10-13 DIAGNOSIS — R39.9 UTI SYMPTOMS: Primary | ICD-10-CM

## 2024-10-13 LAB
APPEARANCE: CLEAR
GLUCOSE (URINE DIPSTICK): NEGATIVE MG/DL
KETONES (URINE DIPSTICK): NEGATIVE MG/DL
MULTISTIX LOT#: ABNORMAL NUMERIC
NITRITE, URINE: NEGATIVE
OCCULT BLOOD: NEGATIVE
PH, URINE: 7.5 (ref 4.5–8)
PROTEIN (URINE DIPSTICK): 30 MG/DL
SPECIFIC GRAVITY: 1.02 (ref 1–1.03)
UROBILINOGEN,SEMI-QN: 2 MG/DL (ref 0–1.9)

## 2024-10-13 PROCEDURE — 81003 URINALYSIS AUTO W/O SCOPE: CPT | Performed by: NURSE PRACTITIONER

## 2024-10-13 PROCEDURE — 3074F SYST BP LT 130 MM HG: CPT | Performed by: NURSE PRACTITIONER

## 2024-10-13 PROCEDURE — 3008F BODY MASS INDEX DOCD: CPT | Performed by: NURSE PRACTITIONER

## 2024-10-13 PROCEDURE — 3079F DIAST BP 80-89 MM HG: CPT | Performed by: NURSE PRACTITIONER

## 2024-10-13 PROCEDURE — 99213 OFFICE O/P EST LOW 20 MIN: CPT | Performed by: NURSE PRACTITIONER

## 2024-10-13 PROCEDURE — 87086 URINE CULTURE/COLONY COUNT: CPT | Performed by: NURSE PRACTITIONER

## 2024-10-13 RX ORDER — CEPHALEXIN 500 MG/1
500 CAPSULE ORAL 2 TIMES DAILY
Qty: 14 CAPSULE | Refills: 0 | Status: SHIPPED | OUTPATIENT
Start: 2024-10-13 | End: 2024-10-20

## 2024-10-13 RX ORDER — METRONIDAZOLE 500 MG/1
500 TABLET ORAL 2 TIMES DAILY
COMMUNITY
Start: 2024-09-05

## 2024-10-13 NOTE — PATIENT INSTRUCTIONS
1. Take Keflex as prescribed.  2. Drink plenty of fluids to keep well-hydrated.   3. Ensure that you urinate frequently, emptying your bladder completely each time.   4. Return to care sooner if symptoms do not improve in the next 2-3 days or you develop fever, flank pain, increased blood in urine, inability to urinate.  5. We will notify you of urine culture results in the next few days.  6. If urine culture is negative, please follow up with PCP or [facility] for further evaluation of symptoms

## 2024-10-13 NOTE — PROGRESS NOTES
Subjective:   Patient ID: Rebecca Perez is a 58 year old female.    Patient is a 58 year old female who presents today with complaints of urinary frequency, urgency, odor, voiding in small amounts, difficulty starting stream, and suprapubic pressure x 1 month. More noticeable over the past few days. Does struggle with constipation. Also notes some gray/brown vaginal discharge that occurs about once per week. Denies dysuria, hematuria, vaginal bleeding, fevers, body aches, chills, n/v/d, abdominal pain, flank pain, vaginal rashes or itching. Patient is not sexually active, denies concern for STI's. Tolerating PO well at home. Attempted treatment prior to arrival = none.         History/Other:   Review of Systems   Constitutional:  Negative for appetite change, chills and fever.   Gastrointestinal:  Positive for constipation. Negative for abdominal pain, diarrhea, nausea and vomiting.   Genitourinary:  Positive for frequency, urgency and vaginal discharge. Negative for dysuria, flank pain, genital sores, hematuria, pelvic pain (suprapubic pressure) and vaginal bleeding.   Musculoskeletal:  Negative for myalgias.   Skin:  Negative for rash.     Current Outpatient Medications   Medication Sig Dispense Refill    metRONIDAZOLE 500 MG Oral Tab Take 1 tablet (500 mg total) by mouth 2 (two) times daily.      cephALEXin (KEFLEX) 500 MG Oral Cap Take 1 capsule (500 mg total) by mouth 2 (two) times daily for 7 days. 14 capsule 0    amLODIPine 5 MG Oral Tab       dexlansoprazole 30 MG Oral Capsule Delayed Release Take by mouth daily.      traZODone 100 MG Oral Tab TAKE ONE TABLET BY MOUTH NIGHTLY FOR INSOMNIA      OZEMPIC, 2 MG/DOSE, 8 MG/3ML Subcutaneous Solution Pen-injector INJECT 2 MG EVERY WEEK BY SUBCUTANEOUS ROUTE AS DIRECTED.      Meloxicam 7.5 MG Oral Tab Take 1 tablet (7.5 mg total) by mouth in the morning and 1 tablet (7.5 mg total) before bedtime. 180 tablet 0    docusate sodium 100 MG Oral Cap Take 1 capsule  (100 mg total) by mouth 2 (two) times daily before meals.      GLYCOPYRROLATE 1 MG Oral Tab TAKE 1 TABLET BY MOUTH THREE TIMES A DAY 90 tablet 1    MONTELUKAST 10 MG Oral Tab TAKE 1 TABLET BY MOUTH EVERY DAY AT NIGHT 90 tablet 1    Venlafaxine HCl  MG Oral Tablet 24 Hr Take 1 tablet (150 mg total) by mouth daily with food.      Lisinopril-hydroCHLOROthiazide 20-25 MG Oral Tab TAKE 1 TABLET BY MOUTH EVERY DAY       Allergies:Allergies[1]    /84   Pulse 83   Temp 97 °F (36.1 °C)   Resp 16   Ht 5' 4\" (1.626 m)   Wt 143 lb 9.6 oz (65.1 kg)   SpO2 99%   BMI 24.65 kg/m²       Objective:   Physical Exam  Vitals reviewed.   Constitutional:       General: She is awake. She is not in acute distress.     Appearance: Normal appearance. She is well-developed and well-groomed. She is not ill-appearing, toxic-appearing or diaphoretic.   HENT:      Head: Normocephalic and atraumatic.   Cardiovascular:      Rate and Rhythm: Normal rate and regular rhythm.   Pulmonary:      Effort: Pulmonary effort is normal. No respiratory distress.      Breath sounds: Normal breath sounds.   Abdominal:      Tenderness: There is no right CVA tenderness or left CVA tenderness.   Skin:     General: Skin is warm and dry.   Neurological:      Mental Status: She is alert and oriented to person, place, and time.   Psychiatric:         Behavior: Behavior is cooperative.         Assessment & Plan:   1. UTI symptoms        Orders Placed This Encounter   Procedures    URINALYSIS, AUTO, W/O SCOPE    Urine Culture, Routine     Results for orders placed or performed in visit on 10/13/24   URINALYSIS, AUTO, W/O SCOPE    Collection Time: 10/13/24 10:59 AM   Result Value Ref Range    Glucose Urine Negative Negative mg/dL    Bilirubin Urine Small (A) Negative    Ketones, UA Negative Negative - Trace mg/dL    Spec Gravity 1.020 1.005 - 1.030    Blood Urine Negative Negative    PH Urine 7.5 5.0 - 8.0    Protein Urine 30 (A) Negative - Trace mg/dL     Urobilinogen Urine 2.0 (A) 0.2 - 1.0 mg/dL    Nitrite Urine Negative Negative    Leukocyte Esterase Urine Trace (A) Negative    APPEARANCE clear Clear    Color Urine dark yellow Yellow    Multistix Lot# [id number] Numeric    Multistix Expiration Date 05/31/2025 Date       Meds This Visit:  Requested Prescriptions     Signed Prescriptions Disp Refills    cephALEXin (KEFLEX) 500 MG Oral Cap 14 capsule 0     Sig: Take 1 capsule (500 mg total) by mouth 2 (two) times daily for 7 days.     Reviewed POC test results with patient.  Urine cx collected and sent - will notify of results.  Reassuring physical exam findings. Vitals WNL.   Will start Keflex today pending cx results. If negative, to discontinue antibiotic and f/u PCP or [facility] for further evaluation of symptoms.  Supportive care and return to care measures reviewed.  Patient v/u and is comfortable with this plan.      Patient Instructions   1. Take Keflex as prescribed.  2. Drink plenty of fluids to keep well-hydrated.   3. Ensure that you urinate frequently, emptying your bladder completely each time.   4. Return to care sooner if symptoms do not improve in the next 2-3 days or you develop fever, flank pain, increased blood in urine, inability to urinate.  5. We will notify you of urine culture results in the next few days.  6. If urine culture is negative, please follow up with PCP or [facility]NE for further evaluation of symptoms           [1]   Allergies  Allergen Reactions    Shellfish-Derived Products SWELLING    Lidocaine OTHER (SEE COMMENTS)     High blood pressure

## 2024-10-17 ENCOUNTER — OFFICE VISIT (OUTPATIENT)
Dept: OTOLARYNGOLOGY | Facility: CLINIC | Age: 59
End: 2024-10-17
Payer: COMMERCIAL

## 2024-10-17 DIAGNOSIS — R09.82 POSTNASAL DISCHARGE: Primary | ICD-10-CM

## 2024-10-17 DIAGNOSIS — R05.9 COUGH, UNSPECIFIED TYPE: ICD-10-CM

## 2024-10-17 PROCEDURE — 99213 OFFICE O/P EST LOW 20 MIN: CPT | Performed by: OTOLARYNGOLOGY

## 2024-10-17 PROCEDURE — 31575 DIAGNOSTIC LARYNGOSCOPY: CPT | Performed by: OTOLARYNGOLOGY

## 2024-10-17 RX ORDER — MONTELUKAST SODIUM 10 MG/1
10 TABLET ORAL NIGHTLY
Qty: 30 TABLET | Refills: 3 | Status: SHIPPED | OUTPATIENT
Start: 2024-10-17

## 2024-10-17 RX ORDER — FAMOTIDINE 40 MG/1
40 TABLET, FILM COATED ORAL DAILY
Qty: 30 TABLET | Refills: 3 | Status: SHIPPED | OUTPATIENT
Start: 2024-10-17

## 2024-10-17 RX ORDER — AZELASTINE 1 MG/ML
2 SPRAY, METERED NASAL 2 TIMES DAILY
Qty: 30 ML | Refills: 3 | Status: SHIPPED | OUTPATIENT
Start: 2024-10-17

## 2024-10-17 NOTE — PROGRESS NOTES
Rebecca Perez is a 58 year old female.    Chief Complaint   Patient presents with    Throat Problem     Patient is here for constantly clearing her throat and difficult to talk reports having silent reflux and feeling something always on her throat.       HISTORY OF PRESENT ILLNESS  She presents with chronic nasal issues.  Has a history of seasonal allergies and complains of congestion and phlegm in the back of her throat.  Constantly coughing and clearing her throat.  Does have a long history of reflux has been seen by a GI doctor in the past had a previous EGD.  Has taken omeprazole in the past which did not work very much pantoprazole helped a little bit and Pepcid seems to help the most.  She takes Pepcid frequently.  Has not seen a GI recently.  No other signs, symptoms or complaints other than blowing out clots of old mucus and blood from her right nostril occasionally in the past and more frequently in the past week or so.      12/15/22 last visit she was started on Claritin-D Singulair and mupirocin for nasal crusting.  Crusting has resolved completely with the use of mupirocin.  Ran out of the drug recently.  Has not been using Claritin-D but has been using montelukast which does help with nasal congestion.  No longer using Flonase due to the nasal crusting.  No other signs, symptoms complaints.  Now having a cough for the past 2 weeks which she states is usually due to GERD.  She does note postnasal drip.     10/17/24 I last saw her almost 2 years ago.  Still complains of throat sensation feels like there is something stuck back there all the time.  Difficult to talk at times she does report having silent reflux and has seen a GI doctor in the past with EGD many years ago none since I last saw her.  She has seen a GI doctor since have seen her and states that omeprazole and other similar PPIs have not helped and feels that even famotidine may not have helped.  Sleep sitting up.      Social History      Socioeconomic History    Marital status:    Tobacco Use    Smoking status: Never    Smokeless tobacco: Never   Vaping Use    Vaping status: Never Used   Substance and Sexual Activity    Alcohol use: Not Currently    Drug use: Never       Family History   Problem Relation Age of Onset    Hypertension Father     Stroke Father     Cancer Father         prostate    Other (Other) Father     Hypertension Mother     Other (Other) Mother         arthritis    Musculo-skelatal Disorder Maternal Grandmother     Hypertension Paternal Grandmother     Stroke Sister     Cancer Sister         uterus    Other (Other) Sister         crohns       Past Medical History:    Depression    Essential hypertension    IBS (irritable bowel syndrome)       No past surgical history on file.      REVIEW OF SYSTEMS    System Neg/Pos Details   Constitutional Negative Fatigue, fever and weight loss.   ENMT Negative Drooling.   Eyes Negative Blurred vision and vision changes.   Respiratory Negative Dyspnea and wheezing.   Cardio Negative Chest pain, irregular heartbeat/palpitations and syncope.   GI Negative Abdominal pain and diarrhea.   Endocrine Negative Cold intolerance and heat intolerance.   Neuro Negative Tremors.   Psych Negative Anxiety and depression.   Integumentary Negative Frequent skin infections, pigment change and rash.   Hema/Lymph Negative Easy bleeding and easy bruising.           PHYSICAL EXAM    There were no vitals taken for this visit.       Constitutional Normal Overall appearance - Normal.   Psychiatric Normal Orientation - Oriented to time, place, person & situation. Appropriate mood and affect.   Neck Exam Normal Inspection - Normal. Palpation - Normal. Parotid gland - Normal. Thyroid gland - Normal.   Eyes Normal Conjunctiva - Right: Normal, Left: Normal. Pupil - Right: Normal, Left: Normal. Fundus - Right: Normal, Left: Normal.   Neurological Normal Memory - Normal. Cranial nerves - Cranial nerves II through  XII grossly intact.   Head/Face Normal Facial features - Normal. Eyebrows - Normal. Skull - Normal.        Nasopharynx Normal External nose - Normal. Lips/teeth/gums - Normal. Tonsils - Normal. Oropharynx - Normal.   Ears Normal Inspection - Right: Normal, Left: Normal. Canal - Right: Normal, Left: Normal. TM - Right: Normal, Left: Normal.   Skin Normal Inspection - Normal.        Lymph Detail Normal Submental. Submandibular. Anterior cervical. Posterior cervical. Supraclavicular.        Nose/Mouth/Throat Normal External nose - Normal. Lips/teeth/gums - Normal. Tonsils - Normal. Oropharynx - Normal.   Nose/Mouth/Throat Normal Nares - Right: Normal Left: Normal. Septum -Normal  Turbinates - Right: Normal, Left: Normal.  Postnasal discharge with erythema of the pharynx.  Nasal mucosa congested   Procedures:  Endoscopy/Laryngoscopy  Pre-Procedure Care: Verbal consent was obtained. Procedure/risks were explained. Questions were answered. Correct patient identified. Correct side and site confirmed.      A topical spray of ).25% Neosynephrine was sprayed into the nose.    Laryngoscopy:  Flexible Fiberoptic Laryngoscopy: A diagnostic flexible fiberoptic laryngoscopy was performed. The flexible fiberoptic laryngoscope was placed into the nose or mouthand advanced  into the interior of the larynx. A thorough examination of the interior of the larynx was performed.   Findings were as follows.       Hypopharynx/Larynx:  Epiglottis is normal.  Arytenoids:  Bilateral: Arytenoids are normal.  Vocal folds-false  Bilateral: Vocal folds (false) are normal.  Vocal folds-true  Bilateral: Vocal folds (true) are normal.  Pyriform sinus:  Bilateral: Pyriform sinuses are normal.  Base of tongue is normal in appearance.  There is no airway obstruction.   General comments: Erythema of the laryngeal structures no lesions masses polyps nodules or any other abnormalities noted.              Current Outpatient Medications:     montelukast 10 MG  Oral Tab, Take 1 tablet (10 mg total) by mouth nightly., Disp: 30 tablet, Rfl: 3    loratadine-pseudoephedrine ER 5-120 MG Oral Tablet 12 Hr, Take 1 tablet by mouth every 12 (twelve) hours., Disp: 60 tablet, Rfl: 3    azelastine 0.1 % Nasal Solution, 2 sprays by Nasal route 2 (two) times daily., Disp: 30 mL, Rfl: 3    famotidine 40 MG Oral Tab, Take 1 tablet (40 mg total) by mouth daily., Disp: 30 tablet, Rfl: 3    metRONIDAZOLE 500 MG Oral Tab, Take 1 tablet (500 mg total) by mouth 2 (two) times daily., Disp: , Rfl:     cephALEXin (KEFLEX) 500 MG Oral Cap, Take 1 capsule (500 mg total) by mouth 2 (two) times daily for 7 days., Disp: 14 capsule, Rfl: 0    amLODIPine 5 MG Oral Tab, , Disp: , Rfl:     dexlansoprazole 30 MG Oral Capsule Delayed Release, Take by mouth daily., Disp: , Rfl:     traZODone 100 MG Oral Tab, TAKE ONE TABLET BY MOUTH NIGHTLY FOR INSOMNIA, Disp: , Rfl:     OZEMPIC, 2 MG/DOSE, 8 MG/3ML Subcutaneous Solution Pen-injector, INJECT 2 MG EVERY WEEK BY SUBCUTANEOUS ROUTE AS DIRECTED., Disp: , Rfl:     Meloxicam 7.5 MG Oral Tab, Take 1 tablet (7.5 mg total) by mouth in the morning and 1 tablet (7.5 mg total) before bedtime., Disp: 180 tablet, Rfl: 0    docusate sodium 100 MG Oral Cap, Take 1 capsule (100 mg total) by mouth 2 (two) times daily before meals., Disp: , Rfl:     GLYCOPYRROLATE 1 MG Oral Tab, TAKE 1 TABLET BY MOUTH THREE TIMES A DAY, Disp: 90 tablet, Rfl: 1    MONTELUKAST 10 MG Oral Tab, TAKE 1 TABLET BY MOUTH EVERY DAY AT NIGHT, Disp: 90 tablet, Rfl: 1    Venlafaxine HCl  MG Oral Tablet 24 Hr, Take 1 tablet (150 mg total) by mouth daily with food., Disp: , Rfl:     Lisinopril-hydroCHLOROthiazide 20-25 MG Oral Tab, TAKE 1 TABLET BY MOUTH EVERY DAY, Disp: , Rfl:   ASSESSMENT AND PLAN    1. Postnasal discharge  - loratadine-pseudoephedrine ER 5-120 MG Oral Tablet 12 Hr; Take 1 tablet by mouth every 12 (twelve) hours.  Dispense: 60 tablet; Refill: 3    2. Cough, unspecified type  Cough is  most likely multifactorial.  I do suspect persistent reflux as well as postnasal discharge planning a larger role in her chronic throat symptoms and cough.  Start famotidine Loratadine-D Singulair and Astelin nasal spray return to see me in 1 month.  I did urged her to see a GI specialist again as she may require a repeat EGD due to her persistent reflux and the risk of Emerson's esophagus.  - LARYNGOSCOPY,FLEX FIBER,DIAGNOSTIC        This note was prepared using Dragon [employer] voice recognition dictation software. As a result errors may occur. When identified these errors have been corrected. While every attempt is made to correct errors during dictation discrepancies may still exist    Abundio Joy MD    10/17/2024    9:20 AM

## 2024-10-28 ENCOUNTER — OFFICE VISIT (OUTPATIENT)
Dept: INTERNAL MEDICINE CLINIC | Facility: CLINIC | Age: 59
End: 2024-10-28
Payer: COMMERCIAL

## 2024-10-28 VITALS
HEIGHT: 64 IN | DIASTOLIC BLOOD PRESSURE: 81 MMHG | BODY MASS INDEX: 24.14 KG/M2 | SYSTOLIC BLOOD PRESSURE: 117 MMHG | OXYGEN SATURATION: 99 % | HEART RATE: 83 BPM | WEIGHT: 141.38 LBS

## 2024-10-28 DIAGNOSIS — K59.00 CONSTIPATION, UNSPECIFIED CONSTIPATION TYPE: ICD-10-CM

## 2024-10-28 DIAGNOSIS — R35.0 URINARY FREQUENCY: Primary | ICD-10-CM

## 2024-10-28 LAB
GLUCOSE (URINE DIPSTICK): NEGATIVE MG/DL
MULTISTIX LOT#: ABNORMAL NUMERIC
NITRITE, URINE: NEGATIVE
OCCULT BLOOD: NEGATIVE
PH, URINE: 6 (ref 4.5–8)
SPECIFIC GRAVITY: 1.03 (ref 1–1.03)
URINE-COLOR: YELLOW
UROBILINOGEN,SEMI-QN: 0.2 MG/DL (ref 0–1.9)

## 2024-10-28 RX ORDER — DOCUSATE SODIUM 100 MG/1
100 CAPSULE, LIQUID FILLED ORAL
Qty: 60 CAPSULE | Refills: 0 | Status: SHIPPED | OUTPATIENT
Start: 2024-10-28 | End: 2024-11-27

## 2024-10-28 RX ORDER — POLYETHYLENE GLYCOL 3350 17 G/17G
17 POWDER, FOR SOLUTION ORAL DAILY
Qty: 30 PACKET | Refills: 0 | Status: SHIPPED | OUTPATIENT
Start: 2024-10-28 | End: 2024-11-27

## 2024-10-28 RX ORDER — VENLAFAXINE HYDROCHLORIDE 150 MG/1
150 CAPSULE, EXTENDED RELEASE ORAL DAILY
COMMUNITY
Start: 2024-09-26

## 2024-10-28 NOTE — PROGRESS NOTES
Subjective:   Rebecca Perez is a 58 year old female who presents for Urinary Frequency (Pt c/o urinary frequency with pressure in pelvis x 1 month. )     Pt reports difficulty initiating stream to void on and off for the past 4 months. Has to push to void, then has feeling of incomplete emptying. +suprapubic pressure but no abd pain. No dysuria, no vaginal bleeding/discharge. Was seen for same earlier this month - given a course of abx (which she completed) and reports no relief of sxs. (Noted in chart that urine culture was negative). Reports being seen by gynecologist in September and was told she does not have any signs of prolapse and this is not a gynecological issue.    Also reports issues with constipation for the past several months since taking Ozempic. Only having 1 bowel movement weekly for the past at least 1 month. Does not drink much water, does not eat very much. Was previously taking colace, but has not been taking for several months.      History/Other:    Chief Complaint Reviewed and Verified  Nursing Notes Reviewed and   Verified  Tobacco Reviewed  Allergies Reviewed  Medications Reviewed    Problem List Reviewed  Medical History Reviewed  Surgical History   Reviewed  OB Status Reviewed  Family History Reviewed  Social History   Reviewed         Tobacco:  She has never smoked tobacco.    Current Outpatient Medications   Medication Sig Dispense Refill    Polyethylene Glycol 3350 (MIRALAX) 17 g Oral Powd Pack Take 17 g by mouth daily. 30 packet 0    docusate sodium 100 MG Oral Cap Take 1 capsule (100 mg total) by mouth 2 (two) times daily before meals. 60 capsule 0    venlafaxine  MG Oral Capsule SR 24 Hr Take 1 capsule (150 mg total) by mouth daily.      montelukast 10 MG Oral Tab Take 1 tablet (10 mg total) by mouth nightly. 30 tablet 3    loratadine-pseudoephedrine ER 5-120 MG Oral Tablet 12 Hr Take 1 tablet by mouth every 12 (twelve) hours. 60 tablet 3    azelastine 0.1 %  Nasal Solution 2 sprays by Nasal route 2 (two) times daily. 30 mL 3    famotidine 40 MG Oral Tab Take 1 tablet (40 mg total) by mouth daily. 30 tablet 3    amLODIPine 5 MG Oral Tab       dexlansoprazole 30 MG Oral Capsule Delayed Release Take by mouth daily.      traZODone 100 MG Oral Tab TAKE ONE TABLET BY MOUTH NIGHTLY FOR INSOMNIA      OZEMPIC, 2 MG/DOSE, 8 MG/3ML Subcutaneous Solution Pen-injector INJECT 2 MG EVERY WEEK BY SUBCUTANEOUS ROUTE AS DIRECTED.      Meloxicam 7.5 MG Oral Tab Take 1 tablet (7.5 mg total) by mouth in the morning and 1 tablet (7.5 mg total) before bedtime. 180 tablet 0    GLYCOPYRROLATE 1 MG Oral Tab TAKE 1 TABLET BY MOUTH THREE TIMES A DAY 90 tablet 1    Lisinopril-hydroCHLOROthiazide 20-25 MG Oral Tab TAKE 1 TABLET BY MOUTH EVERY DAY           Review of Systems:  Review of Systems   Gastrointestinal:  Positive for constipation. Negative for abdominal pain, nausea and vomiting.        **reports 1 bowel movement per week for the past couple of months   Genitourinary:  Positive for difficulty urinating and frequency. Negative for dysuria, flank pain, pelvic pain, vaginal discharge and vaginal pain.     10 point review of systems otherwise negative with the exception of HPI and assessment and plan      Objective:   /81 (BP Location: Left arm, Patient Position: Sitting, Cuff Size: adult)   Pulse 83   Ht 5' 4\" (1.626 m)   Wt 141 lb 6 oz (64.1 kg)   SpO2 99%   BMI 24.27 kg/m²  Estimated body mass index is 24.27 kg/m² as calculated from the following:    Height as of this encounter: 5' 4\" (1.626 m).    Weight as of this encounter: 141 lb 6 oz (64.1 kg).  Physical Exam  Constitutional:       General: She is not in acute distress.     Appearance: Normal appearance. She is not ill-appearing.   Cardiovascular:      Rate and Rhythm: Normal rate and regular rhythm.      Heart sounds: Normal heart sounds.   Pulmonary:      Effort: Pulmonary effort is normal.      Breath sounds: Normal  breath sounds.   Abdominal:      General: Bowel sounds are normal.      Palpations: Abdomen is soft. There is no mass.      Tenderness: There is no abdominal tenderness. There is no guarding.      Hernia: No hernia is present.   Neurological:      Mental Status: She is alert.   Psychiatric:         Mood and Affect: Mood normal.         Assessment & Plan:   1. Urinary frequency (Primary)  -     POC Urinalysis, Manual Dip without microscopy [[id number]]  -  Results reviewed in office. Concentrated. Not concerning for UTI, no culture sent.  - Discussed how below treatments for constipation may help with her urinary concerns. If not improved in the next 2 weeks, please follow-up with me.    2. Constipation, unspecified constipation type  -     Polyethylene Glycol 3350; Take 17 g by mouth daily.  Dispense: 30 packet; Refill: 0  -     Docusate Sodium; Take 1 capsule (100 mg total) by mouth 2 (two) times daily before meals.  Dispense: 60 capsule; Refill: 0  -  Take miralax as directed in at least 8 full ounces of water. Discussed increasing fluid and fiber intake.        Return in about 2 weeks (around 11/11/2024), or if no improvement in symptoms..    Edna Cota, RAIZA, 10/28/2024, 10:18 AM

## 2024-10-28 NOTE — PATIENT INSTRUCTIONS
Take miralax daily with 8 full ounces of water per package instructions. Try to increase your daily water intake as well.  Eat more foods containing fiber.  If this issue continues after trying these changes after the next 2 weeks, please follow-up with me.

## 2024-12-07 ENCOUNTER — OFFICE VISIT (OUTPATIENT)
Dept: FAMILY MEDICINE CLINIC | Facility: CLINIC | Age: 59
End: 2024-12-07
Payer: COMMERCIAL

## 2024-12-07 VITALS
HEIGHT: 64 IN | BODY MASS INDEX: 23.73 KG/M2 | HEART RATE: 71 BPM | TEMPERATURE: 98 F | RESPIRATION RATE: 16 BRPM | SYSTOLIC BLOOD PRESSURE: 122 MMHG | OXYGEN SATURATION: 99 % | DIASTOLIC BLOOD PRESSURE: 82 MMHG | WEIGHT: 139 LBS

## 2024-12-07 DIAGNOSIS — N89.8 VAGINAL DISCHARGE: Primary | ICD-10-CM

## 2024-12-07 PROCEDURE — 81514 NFCT DS BV&VAGINITIS DNA ALG: CPT

## 2024-12-07 NOTE — PROGRESS NOTES
CHIEF COMPLAINT:     Chief Complaint   Patient presents with    Vaginal Discharge       HPI:   Rebecca Perez is a 58 year old female who presents with vaginal symptoms.  Patient reporting symptoms of vaginal discharge since August.  Symptoms have been increasing since onset.  Treatments tried: nothing prior to arrival.  Associated symptoms: +odor to discharge.  Patient denies dysuria, urinary frequency, flank pain, fever, hematuria, nausea, or vomiting. Patient denies concern for STI as she states she is not currently sexually active. Patient states she has seen the gynecologist for this in the past. Patient states she has had a hx of BV.        Current Outpatient Medications   Medication Sig Dispense Refill    venlafaxine  MG Oral Capsule SR 24 Hr Take 1 capsule (150 mg total) by mouth daily.      montelukast 10 MG Oral Tab Take 1 tablet (10 mg total) by mouth nightly. 30 tablet 3    loratadine-pseudoephedrine ER 5-120 MG Oral Tablet 12 Hr Take 1 tablet by mouth every 12 (twelve) hours. 60 tablet 3    azelastine 0.1 % Nasal Solution 2 sprays by Nasal route 2 (two) times daily. 30 mL 3    famotidine 40 MG Oral Tab Take 1 tablet (40 mg total) by mouth daily. 30 tablet 3    amLODIPine 5 MG Oral Tab       traZODone 100 MG Oral Tab TAKE ONE TABLET BY MOUTH NIGHTLY FOR INSOMNIA      OZEMPIC, 2 MG/DOSE, 8 MG/3ML Subcutaneous Solution Pen-injector INJECT 2 MG EVERY WEEK BY SUBCUTANEOUS ROUTE AS DIRECTED.      Meloxicam 7.5 MG Oral Tab Take 1 tablet (7.5 mg total) by mouth in the morning and 1 tablet (7.5 mg total) before bedtime. 180 tablet 0    GLYCOPYRROLATE 1 MG Oral Tab TAKE 1 TABLET BY MOUTH THREE TIMES A DAY 90 tablet 1    Lisinopril-hydroCHLOROthiazide 20-25 MG Oral Tab TAKE 1 TABLET BY MOUTH EVERY DAY      dexlansoprazole 30 MG Oral Capsule Delayed Release Take by mouth daily. (Patient not taking: Reported on 12/7/2024)        Past Medical History:    Depression    Essential hypertension    IBS  (irritable bowel syndrome)      Social History:  Social History     Socioeconomic History    Marital status:    Tobacco Use    Smoking status: Never    Smokeless tobacco: Never   Vaping Use    Vaping status: Never Used   Substance and Sexual Activity    Alcohol use: Not Currently    Drug use: Never    Sexual activity: Not Currently     Social Drivers of Health     Financial Resource Strain: Low Risk  (8/3/2024)    Received from [facility]    Overall Financial Resource Strain (CARDIA)     Difficulty of Paying Living Expenses: Not hard at all   Food Insecurity: No Food Insecurity (8/3/2024)    Received from [facility]    Hunger Vital Sign     Worried About Running Out of Food in the Last Year: Never true     Ran Out of Food in the Last Year: Never true   Transportation Needs: No Transportation Needs (8/3/2024)    Received from [facility]    PRAPARE - Transportation     Lack of Transportation (Medical): No     Lack of Transportation (Non-Medical): No   Housing Stability: Low Risk  (8/3/2024)    Received from [facility]    Housing Stability Vital Sign     Unable to Pay for Housing in the Last Year: No     Number of Places Lived in the Last Year: 1     Unstable Housing in the Last Year: No         REVIEW OF SYSTEMS:   GENERAL: See above  GI: See HPI.    : See HPI.      EXAM:   /82   Pulse 71   Temp 97.6 °F (36.4 °C)   Resp 16   Ht 5' 4\" (1.626 m)   Wt 139 lb (63 kg)   SpO2 99%   BMI 23.86 kg/m²   Physical Exam  Vitals reviewed.   Constitutional:       Appearance: Normal appearance.   HENT:      Head: Normocephalic and atraumatic.      Nose: Nose normal.      Mouth/Throat:      Mouth: Mucous membranes are moist.   Eyes:      Conjunctiva/sclera: Conjunctivae normal.   Cardiovascular:      Rate and Rhythm: Normal rate.   Pulmonary:      Effort: Pulmonary effort is normal. No respiratory distress.   Abdominal:       General: There is no distension.      Palpations: Abdomen is soft.      Tenderness: There is no abdominal tenderness. There is no right CVA tenderness or left CVA tenderness.   Genitourinary:     General: Normal vulva.      Exam position: Lithotomy position.      González stage (genital): 5.      Vagina: Normal. No vaginal discharge.   Musculoskeletal:         General: Normal range of motion.      Cervical back: Normal range of motion.   Skin:     General: Skin is warm and dry.   Neurological:      General: No focal deficit present.      Mental Status: She is alert and oriented to person, place, and time.   Psychiatric:         Mood and Affect: Mood normal.         Behavior: Behavior normal.       ASSESSMENT AND PLAN:   Rebecca Perez is a 58 year old female presents with urinary symptoms.    ASSESSMENT:  Encounter Diagnosis   Name Primary?    Vaginal discharge Yes       Orders Placed This Encounter   Procedures    Vaginitis Vaginosis PCR Panel       PLAN: Education provided.  Questions answered.  Reassurance given. Vaginosis/Vaginitis Panel obtained and sent to lab; results pending and will treat according to results. Patient aware that results will be released in [facility]. Verbal instructions given for patient care. Patient will follow up with PCP and or OB/GYN if not improving and seek immediate care for worsening symptoms. Patient instructions provided. The patient indicates understanding of these issues and agrees to the plan.

## 2024-12-17 ENCOUNTER — OFFICE VISIT (OUTPATIENT)
Dept: INTERNAL MEDICINE CLINIC | Facility: CLINIC | Age: 59
End: 2024-12-17
Payer: COMMERCIAL

## 2024-12-17 VITALS
HEART RATE: 73 BPM | HEIGHT: 64 IN | WEIGHT: 138 LBS | RESPIRATION RATE: 12 BRPM | TEMPERATURE: 98 F | SYSTOLIC BLOOD PRESSURE: 142 MMHG | DIASTOLIC BLOOD PRESSURE: 88 MMHG | BODY MASS INDEX: 23.56 KG/M2

## 2024-12-17 DIAGNOSIS — N95.8 GENITOURINARY SYNDROME OF MENOPAUSE: Primary | ICD-10-CM

## 2024-12-17 PROCEDURE — 3077F SYST BP >= 140 MM HG: CPT | Performed by: NURSE PRACTITIONER

## 2024-12-17 PROCEDURE — 3079F DIAST BP 80-89 MM HG: CPT | Performed by: NURSE PRACTITIONER

## 2024-12-17 PROCEDURE — 3008F BODY MASS INDEX DOCD: CPT | Performed by: NURSE PRACTITIONER

## 2024-12-17 PROCEDURE — 99213 OFFICE O/P EST LOW 20 MIN: CPT | Performed by: NURSE PRACTITIONER

## 2024-12-18 NOTE — PROGRESS NOTES
Subjective:   Rebecca Perez is a 58 year old female who presents for Vaginal Discharge     Presents for evaluation of vaginal discharge.  This has been an ongoing issue for her for the past several months.  She has been evaluated by me and also a walk-in clinic, and reportedly by outside ob/gyn as well.  Has been tx with antibiotics and diflucan with no change in sxs. At her initial visit with me we ruled out urinary tract infection, and she started taking MiraLAX/adding Metamucil due to having 1 BM weekly.  She reports having 2-3 bowel movements per week now and her urinary symptoms (of feeling incomplete emptying) have improved.    She is having ongoing small amounts of what she reports is foul-smelling vaginal discharge.  None right now.  Showed me a picture of soiled underwear that had a small amount of dried yellowish colored nonbloody discharge.  She says the discharge has not worsened or changed in any way since her last evaluation on 12/7, when a vulvovaginal candidiasis panel was negative.    She is not having any vaginal itching or vaginal bleeding.  She is not currently sexually active.  She does not have any lesions or sores in her vaginal area.  She is not having any urinary symptoms.  She is bothered by the discharge.  She says she has never tried any over-the-counter vaginal moisturizers.      History/Other:    Chief Complaint Reviewed and Verified  No Further Nursing Notes to   Review  Tobacco Reviewed  Allergies Reviewed  Medications Reviewed    Problem List Reviewed  Medical History Reviewed  Surgical History   Reviewed  OB Status Reviewed  Family History Reviewed  Social History   Reviewed         Tobacco:  She has never smoked tobacco.    Current Outpatient Medications   Medication Sig Dispense Refill    venlafaxine  MG Oral Capsule SR 24 Hr Take 1 capsule (150 mg total) by mouth daily.      montelukast 10 MG Oral Tab Take 1 tablet (10 mg total) by mouth nightly. 30 tablet 3     azelastine 0.1 % Nasal Solution 2 sprays by Nasal route 2 (two) times daily. 30 mL 3    famotidine 40 MG Oral Tab Take 1 tablet (40 mg total) by mouth daily. 30 tablet 3    amLODIPine 5 MG Oral Tab       traZODone 100 MG Oral Tab TAKE ONE TABLET BY MOUTH NIGHTLY FOR INSOMNIA      OZEMPIC, 2 MG/DOSE, 8 MG/3ML Subcutaneous Solution Pen-injector INJECT 2 MG EVERY WEEK BY SUBCUTANEOUS ROUTE AS DIRECTED.      Meloxicam 7.5 MG Oral Tab Take 1 tablet (7.5 mg total) by mouth in the morning and 1 tablet (7.5 mg total) before bedtime. 180 tablet 0    Lisinopril-hydroCHLOROthiazide 20-25 MG Oral Tab TAKE 1 TABLET BY MOUTH EVERY DAY      loratadine-pseudoephedrine ER 5-120 MG Oral Tablet 12 Hr Take 1 tablet by mouth every 12 (twelve) hours. 60 tablet 3    dexlansoprazole 30 MG Oral Capsule Delayed Release Take by mouth daily. (Patient not taking: Reported on 12/7/2024)      GLYCOPYRROLATE 1 MG Oral Tab TAKE 1 TABLET BY MOUTH THREE TIMES A DAY 90 tablet 1         Review of Systems:  Review of Systems  10 point review of systems otherwise negative with the exception of HPI and assessment and plan.    Objective:   /88 (BP Location: Left arm, Patient Position: Sitting, Cuff Size: adult)   Pulse 73   Temp 98 °F (36.7 °C) (Oral)   Resp 12   Ht 5' 4\" (1.626 m)   Wt 138 lb (62.6 kg)   BMI 23.69 kg/m²  Estimated body mass index is 23.69 kg/m² as calculated from the following:    Height as of this encounter: 5' 4\" (1.626 m).    Weight as of this encounter: 138 lb (62.6 kg).      Physical Exam  Vitals reviewed.   Constitutional:       General: She is not in acute distress.  Neurological:      Mental Status: She is alert.         Assessment & Plan:   1. Genitourinary syndrome of menopause (Primary)  -Based on her ongoing symptom of vaginal discharge without any other accompanying/bothersome symptoms, and testing not yielding any positive infectious results through urine or vaginal swab, suspect vulvovaginal  atrophy.  -Discussed that vaginal discharge in the absence of other symptoms is not terribly alarming. Discussed that postmenopausal vaginal bleeding would be more concerning.  -Discussed OTC vaginal moisturizers -- Replens, Vagisil, Feminease, Moist Again, K-Y Liquibeads, Hyalo GYN, Revaree - read ingredients and use per package instructions - but typically 2-3 days per week.  -Can consider vaginal estrogen therapy if this fails.    F/u in 3-4 weeks if symptoms persist.        Return in about 3 weeks (around 1/7/2025).    RAIZA Rojas, 12/17/2024, 6:16 PM     This note was prepared using Dragon Medical voice recognition dictation software. As a result errors may occur. When identified, these errors have been corrected. While every attempt is made to correct errors during dictation discrepancies may still exist.

## 2024-12-18 NOTE — PATIENT INSTRUCTIONS
Recommend otc vaginal moisturizers: Replens, Vagisil, Feminease, Moist Again, K-Y Liquibeads, Hyalo GYN, Revaree suppositories.    Read the package instructions, but generally these are taken 2-3 days per week.

## 2025-01-13 RX ORDER — MONTELUKAST SODIUM 10 MG/1
10 TABLET ORAL NIGHTLY
Qty: 90 TABLET | Refills: 0 | Status: SHIPPED | OUTPATIENT
Start: 2025-01-13

## 2025-01-13 RX ORDER — FAMOTIDINE 40 MG/1
40 TABLET, FILM COATED ORAL DAILY
Qty: 90 TABLET | Refills: 0 | Status: SHIPPED | OUTPATIENT
Start: 2025-01-13

## 2025-01-13 NOTE — TELEPHONE ENCOUNTER
Refill request sent to pharmacy on 1/13/25 for Montelukast and Famotidine, last Office visit on 10/17/24

## 2025-04-14 RX ORDER — MONTELUKAST SODIUM 10 MG/1
10 TABLET ORAL NIGHTLY
Qty: 90 TABLET | Refills: 0 | OUTPATIENT
Start: 2025-04-14

## 2025-04-14 RX ORDER — FAMOTIDINE 40 MG/1
40 TABLET, FILM COATED ORAL DAILY
Qty: 90 TABLET | Refills: 0 | OUTPATIENT
Start: 2025-04-14

## 2025-04-14 NOTE — TELEPHONE ENCOUNTER
Last appt 10/17/24, Dr. Joy asked patient to return in 1 month due to her reflux issues and risk of Emerson's esophagus. Will need a follow up appt.

## 2025-04-27 ENCOUNTER — LAB ENCOUNTER (OUTPATIENT)
Dept: LAB | Facility: HOSPITAL | Age: 60
End: 2025-04-27
Attending: NURSE PRACTITIONER
Payer: COMMERCIAL

## 2025-04-27 DIAGNOSIS — I10 BENIGN HYPERTENSION: Primary | ICD-10-CM

## 2025-04-27 LAB
ALBUMIN SERPL-MCNC: 4.3 G/DL (ref 3.2–4.8)
ALBUMIN/GLOB SERPL: 1.6 {RATIO} (ref 1–2)
ALP LIVER SERPL-CCNC: 106 U/L (ref 50–130)
ALT SERPL-CCNC: 9 U/L (ref 10–49)
ANION GAP SERPL CALC-SCNC: 6 MMOL/L (ref 0–18)
AST SERPL-CCNC: 17 U/L (ref ?–34)
BASOPHILS # BLD AUTO: 0.03 X10(3) UL (ref 0–0.2)
BASOPHILS NFR BLD AUTO: 0.7 %
BILIRUB SERPL-MCNC: 0.6 MG/DL (ref 0.2–1.1)
BILIRUB UR QL: NEGATIVE
BUN BLD-MCNC: 11 MG/DL (ref 9–23)
BUN/CREAT SERPL: 11.3 (ref 10–20)
CALCIUM BLD-MCNC: 9.2 MG/DL (ref 8.7–10.4)
CHLORIDE SERPL-SCNC: 104 MMOL/L (ref 98–112)
CHOLEST SERPL-MCNC: 209 MG/DL (ref ?–200)
CLARITY UR: CLEAR
CO2 SERPL-SCNC: 30 MMOL/L (ref 21–32)
CREAT BLD-MCNC: 0.97 MG/DL (ref 0.55–1.02)
DEPRECATED RDW RBC AUTO: 35.8 FL (ref 35.1–46.3)
EGFRCR SERPLBLD CKD-EPI 2021: 66 ML/MIN/1.73M2 (ref 60–?)
EOSINOPHIL # BLD AUTO: 0.24 X10(3) UL (ref 0–0.7)
EOSINOPHIL NFR BLD AUTO: 5.4 %
ERYTHROCYTE [DISTWIDTH] IN BLOOD BY AUTOMATED COUNT: 11.9 % (ref 11–15)
FASTING PATIENT LIPID ANSWER: YES
FASTING STATUS PATIENT QL REPORTED: YES
GLOBULIN PLAS-MCNC: 2.7 G/DL (ref 2–3.5)
GLUCOSE BLD-MCNC: 70 MG/DL (ref 70–99)
GLUCOSE UR-MCNC: NORMAL MG/DL
HCT VFR BLD AUTO: 38.6 % (ref 35–48)
HDLC SERPL-MCNC: 86 MG/DL (ref 40–59)
HGB BLD-MCNC: 13 G/DL (ref 12–16)
HGB UR QL STRIP.AUTO: NEGATIVE
IMM GRANULOCYTES # BLD AUTO: 0.01 X10(3) UL (ref 0–1)
IMM GRANULOCYTES NFR BLD: 0.2 %
KETONES UR-MCNC: NEGATIVE MG/DL
LDLC SERPL CALC-MCNC: 113 MG/DL (ref ?–100)
LEUKOCYTE ESTERASE UR QL STRIP.AUTO: 75
LYMPHOCYTES # BLD AUTO: 2.02 X10(3) UL (ref 1–4)
LYMPHOCYTES NFR BLD AUTO: 45.1 %
MCH RBC QN AUTO: 28 PG (ref 26–34)
MCHC RBC AUTO-ENTMCNC: 33.7 G/DL (ref 31–37)
MCV RBC AUTO: 83 FL (ref 80–100)
MONOCYTES # BLD AUTO: 0.42 X10(3) UL (ref 0.1–1)
MONOCYTES NFR BLD AUTO: 9.4 %
NEUTROPHILS # BLD AUTO: 1.76 X10 (3) UL (ref 1.5–7.7)
NEUTROPHILS # BLD AUTO: 1.76 X10(3) UL (ref 1.5–7.7)
NEUTROPHILS NFR BLD AUTO: 39.2 %
NITRITE UR QL STRIP.AUTO: NEGATIVE
NONHDLC SERPL-MCNC: 123 MG/DL (ref ?–130)
OSMOLALITY SERPL CALC.SUM OF ELEC: 288 MOSM/KG (ref 275–295)
PH UR: 7 [PH] (ref 5–8)
PLATELET # BLD AUTO: 244 10(3)UL (ref 150–450)
POTASSIUM SERPL-SCNC: 3.8 MMOL/L (ref 3.5–5.1)
PROT SERPL-MCNC: 7 G/DL (ref 5.7–8.2)
PROT UR-MCNC: NEGATIVE MG/DL
RBC # BLD AUTO: 4.65 X10(6)UL (ref 3.8–5.3)
SODIUM SERPL-SCNC: 140 MMOL/L (ref 136–145)
SP GR UR STRIP: 1.02 (ref 1–1.03)
TRIGL SERPL-MCNC: 57 MG/DL (ref 30–149)
TSI SER-ACNC: 1.74 UIU/ML (ref 0.55–4.78)
UROBILINOGEN UR STRIP-ACNC: NORMAL
VLDLC SERPL CALC-MCNC: 10 MG/DL (ref 0–30)
WBC # BLD AUTO: 4.5 X10(3) UL (ref 4–11)

## 2025-04-27 PROCEDURE — 80053 COMPREHEN METABOLIC PANEL: CPT

## 2025-04-27 PROCEDURE — 81001 URINALYSIS AUTO W/SCOPE: CPT

## 2025-04-27 PROCEDURE — 84443 ASSAY THYROID STIM HORMONE: CPT

## 2025-04-27 PROCEDURE — 36415 COLL VENOUS BLD VENIPUNCTURE: CPT

## 2025-04-27 PROCEDURE — 80061 LIPID PANEL: CPT

## 2025-04-27 PROCEDURE — 85025 COMPLETE CBC W/AUTO DIFF WBC: CPT

## 2025-05-10 ENCOUNTER — HOSPITAL ENCOUNTER (OUTPATIENT)
Dept: MAMMOGRAPHY | Age: 60
Discharge: HOME | End: 2025-05-10
Attending: INTERNAL MEDICINE
Payer: COMMERCIAL

## 2025-05-10 DIAGNOSIS — Z12.31 ENCOUNTER FOR SCREENING MAMMOGRAM FOR MALIGNANT NEOPLASM OF BREAST: ICD-10-CM

## 2025-05-10 DIAGNOSIS — Z12.31 VISIT FOR SCREENING MAMMOGRAM: ICD-10-CM

## 2025-05-10 PROCEDURE — 77067 SCR MAMMO BI INCL CAD: CPT | Performed by: INTERNAL MEDICINE

## 2025-05-10 PROCEDURE — 77063 BREAST TOMOSYNTHESIS BI: CPT | Performed by: INTERNAL MEDICINE

## 2025-07-28 ENCOUNTER — HOSPITAL ENCOUNTER (OUTPATIENT)
Dept: MAMMOGRAPHY | Facility: HOSPITAL | Age: 60
Discharge: HOME | End: 2025-07-28
Attending: INTERNAL MEDICINE

## 2025-07-28 DIAGNOSIS — R92.2 INCONCLUSIVE MAMMOGRAM: ICD-10-CM

## 2025-07-28 PROCEDURE — 76642 ULTRASOUND BREAST LIMITED: CPT | Performed by: INTERNAL MEDICINE

## 2025-07-28 PROCEDURE — 77066 DX MAMMO INCL CAD BI: CPT | Performed by: INTERNAL MEDICINE

## 2025-07-28 PROCEDURE — 77062 BREAST TOMOSYNTHESIS BI: CPT | Performed by: INTERNAL MEDICINE

## (undated) NOTE — LETTER
Date & Time: 11/15/2020, 11:56 AM  Patient: Deidra Nab  Encounter Provider(s):    Russell Burrows MD       To Whom It May Concern: Chas Enriquez was seen and treated in our department on 11/15/2020.  She can return to work on 11/19/2